# Patient Record
Sex: FEMALE | Race: WHITE | NOT HISPANIC OR LATINO | Employment: FULL TIME | URBAN - METROPOLITAN AREA
[De-identification: names, ages, dates, MRNs, and addresses within clinical notes are randomized per-mention and may not be internally consistent; named-entity substitution may affect disease eponyms.]

---

## 2021-01-30 ENCOUNTER — NURSE TRIAGE (OUTPATIENT)
Dept: OTHER | Facility: OTHER | Age: 61
End: 2021-01-30

## 2021-01-30 NOTE — TELEPHONE ENCOUNTER
Regarding: COVID- Symptomatic/ Sore Throat- Family 1 of 3  ----- Message from [address]  sent at 1/30/2021 10:15 AM EST -----  "I would like myself as well and my daughter and  to get tested for COVID-19   I have a sore throat, my daughter has a headache, and my  has a cough, sneezing, and sore throat "    Juanita w/ Cliff Espinal and FORTINO Kauffman

## 2021-03-31 DIAGNOSIS — Z23 ENCOUNTER FOR IMMUNIZATION: ICD-10-CM

## 2022-04-27 ENCOUNTER — HOSPITAL ENCOUNTER (INPATIENT)
Facility: HOSPITAL | Age: 62
LOS: 9 days | Discharge: HOME | DRG: 336 | End: 2022-05-06
Attending: EMERGENCY MEDICINE | Admitting: SURGERY
Payer: COMMERCIAL

## 2022-04-27 ENCOUNTER — APPOINTMENT (EMERGENCY)
Dept: CT IMAGING | Facility: HOSPITAL | Age: 62
DRG: 336 | End: 2022-04-27
Payer: COMMERCIAL

## 2022-04-27 DIAGNOSIS — R10.9 ABDOMINAL PAIN: ICD-10-CM

## 2022-04-27 DIAGNOSIS — K56.609 SBO (SMALL BOWEL OBSTRUCTION) (HCC): Primary | ICD-10-CM

## 2022-04-27 LAB
ALBUMIN SERPL BCP-MCNC: 4.2 G/DL (ref 3.5–5)
ALP SERPL-CCNC: 100 U/L (ref 46–116)
ALT SERPL W P-5'-P-CCNC: 25 U/L (ref 12–78)
ANION GAP SERPL CALCULATED.3IONS-SCNC: 10 MMOL/L (ref 4–13)
AST SERPL W P-5'-P-CCNC: 21 U/L (ref 5–45)
ATRIAL RATE: 87 BPM
BASOPHILS # BLD AUTO: 0.02 THOUSANDS/ΜL (ref 0–0.1)
BASOPHILS NFR BLD AUTO: 0 % (ref 0–1)
BILIRUB SERPL-MCNC: 1.34 MG/DL (ref 0.2–1)
BILIRUB UR QL STRIP: NEGATIVE
BUN SERPL-MCNC: 28 MG/DL (ref 5–25)
CALCIUM SERPL-MCNC: 10 MG/DL (ref 8.3–10.1)
CARDIAC TROPONIN I PNL SERPL HS: 3 NG/L
CHLORIDE SERPL-SCNC: 99 MMOL/L (ref 100–108)
CLARITY UR: CLEAR
CO2 SERPL-SCNC: 27 MMOL/L (ref 21–32)
COLOR UR: YELLOW
CREAT SERPL-MCNC: 0.93 MG/DL (ref 0.6–1.3)
EOSINOPHIL # BLD AUTO: 0.03 THOUSAND/ΜL (ref 0–0.61)
EOSINOPHIL NFR BLD AUTO: 0 % (ref 0–6)
ERYTHROCYTE [DISTWIDTH] IN BLOOD BY AUTOMATED COUNT: 11.9 % (ref 11.6–15.1)
GFR SERPL CREATININE-BSD FRML MDRD: 65 ML/MIN/1.73SQ M
GLUCOSE SERPL-MCNC: 134 MG/DL (ref 65–140)
GLUCOSE UR STRIP-MCNC: NEGATIVE MG/DL
HCT VFR BLD AUTO: 47.9 % (ref 34.8–46.1)
HGB BLD-MCNC: 17 G/DL (ref 11.5–15.4)
HGB UR QL STRIP.AUTO: NEGATIVE
IMM GRANULOCYTES # BLD AUTO: 0.02 THOUSAND/UL (ref 0–0.2)
IMM GRANULOCYTES NFR BLD AUTO: 0 % (ref 0–2)
KETONES UR STRIP-MCNC: NEGATIVE MG/DL
LEUKOCYTE ESTERASE UR QL STRIP: NEGATIVE
LIPASE SERPL-CCNC: 149 U/L (ref 73–393)
LYMPHOCYTES # BLD AUTO: 1.09 THOUSANDS/ΜL (ref 0.6–4.47)
LYMPHOCYTES NFR BLD AUTO: 11 % (ref 14–44)
MCH RBC QN AUTO: 31.9 PG (ref 26.8–34.3)
MCHC RBC AUTO-ENTMCNC: 35.5 G/DL (ref 31.4–37.4)
MCV RBC AUTO: 90 FL (ref 82–98)
MONOCYTES # BLD AUTO: 1.1 THOUSAND/ΜL (ref 0.17–1.22)
MONOCYTES NFR BLD AUTO: 11 % (ref 4–12)
NEUTROPHILS # BLD AUTO: 8 THOUSANDS/ΜL (ref 1.85–7.62)
NEUTS SEG NFR BLD AUTO: 78 % (ref 43–75)
NITRITE UR QL STRIP: NEGATIVE
NRBC BLD AUTO-RTO: 0 /100 WBCS
P AXIS: 39 DEGREES
PH UR STRIP.AUTO: 5.5 [PH]
PLATELET # BLD AUTO: 265 THOUSANDS/UL (ref 149–390)
PLATELET # BLD AUTO: 292 THOUSANDS/UL (ref 149–390)
PMV BLD AUTO: 9.4 FL (ref 8.9–12.7)
PMV BLD AUTO: 9.8 FL (ref 8.9–12.7)
POTASSIUM SERPL-SCNC: 4.6 MMOL/L (ref 3.5–5.3)
PR INTERVAL: 146 MS
PROT SERPL-MCNC: 8.2 G/DL (ref 6.4–8.2)
PROT UR STRIP-MCNC: NEGATIVE MG/DL
QRS AXIS: -4 DEGREES
QRSD INTERVAL: 80 MS
QT INTERVAL: 372 MS
QTC INTERVAL: 447 MS
RBC # BLD AUTO: 5.33 MILLION/UL (ref 3.81–5.12)
SODIUM SERPL-SCNC: 136 MMOL/L (ref 136–145)
SP GR UR STRIP.AUTO: >=1.03 (ref 1–1.03)
T WAVE AXIS: 56 DEGREES
UROBILINOGEN UR QL STRIP.AUTO: 0.2 E.U./DL
VENTRICULAR RATE: 87 BPM
WBC # BLD AUTO: 10.26 THOUSAND/UL (ref 4.31–10.16)

## 2022-04-27 PROCEDURE — 96361 HYDRATE IV INFUSION ADD-ON: CPT

## 2022-04-27 PROCEDURE — 96374 THER/PROPH/DIAG INJ IV PUSH: CPT

## 2022-04-27 PROCEDURE — 36415 COLL VENOUS BLD VENIPUNCTURE: CPT

## 2022-04-27 PROCEDURE — 80053 COMPREHEN METABOLIC PANEL: CPT | Performed by: EMERGENCY MEDICINE

## 2022-04-27 PROCEDURE — 96375 TX/PRO/DX INJ NEW DRUG ADDON: CPT

## 2022-04-27 PROCEDURE — 74177 CT ABD & PELVIS W/CONTRAST: CPT

## 2022-04-27 PROCEDURE — 99285 EMERGENCY DEPT VISIT HI MDM: CPT | Performed by: EMERGENCY MEDICINE

## 2022-04-27 PROCEDURE — 85049 AUTOMATED PLATELET COUNT: CPT | Performed by: SURGERY

## 2022-04-27 PROCEDURE — 83690 ASSAY OF LIPASE: CPT | Performed by: EMERGENCY MEDICINE

## 2022-04-27 PROCEDURE — 93005 ELECTROCARDIOGRAM TRACING: CPT

## 2022-04-27 PROCEDURE — 85025 COMPLETE CBC W/AUTO DIFF WBC: CPT | Performed by: EMERGENCY MEDICINE

## 2022-04-27 PROCEDURE — 81003 URINALYSIS AUTO W/O SCOPE: CPT | Performed by: EMERGENCY MEDICINE

## 2022-04-27 PROCEDURE — G1004 CDSM NDSC: HCPCS

## 2022-04-27 PROCEDURE — 93010 ELECTROCARDIOGRAM REPORT: CPT | Performed by: INTERNAL MEDICINE

## 2022-04-27 PROCEDURE — 84484 ASSAY OF TROPONIN QUANT: CPT | Performed by: EMERGENCY MEDICINE

## 2022-04-27 PROCEDURE — 99285 EMERGENCY DEPT VISIT HI MDM: CPT

## 2022-04-27 RX ORDER — MORPHINE SULFATE 4 MG/ML
4 INJECTION, SOLUTION INTRAMUSCULAR; INTRAVENOUS ONCE
Status: COMPLETED | OUTPATIENT
Start: 2022-04-27 | End: 2022-04-27

## 2022-04-27 RX ORDER — ATORVASTATIN CALCIUM 10 MG/1
10 TABLET, FILM COATED ORAL DAILY
COMMUNITY

## 2022-04-27 RX ORDER — ONDANSETRON 2 MG/ML
4 INJECTION INTRAMUSCULAR; INTRAVENOUS EVERY 6 HOURS PRN
Status: DISCONTINUED | OUTPATIENT
Start: 2022-04-27 | End: 2022-05-06 | Stop reason: HOSPADM

## 2022-04-27 RX ORDER — ONDANSETRON 2 MG/ML
4 INJECTION INTRAMUSCULAR; INTRAVENOUS ONCE
Status: COMPLETED | OUTPATIENT
Start: 2022-04-27 | End: 2022-04-27

## 2022-04-27 RX ORDER — HEPARIN SODIUM 5000 [USP'U]/ML
5000 INJECTION, SOLUTION INTRAVENOUS; SUBCUTANEOUS EVERY 8 HOURS SCHEDULED
Status: DISCONTINUED | OUTPATIENT
Start: 2022-04-27 | End: 2022-05-06 | Stop reason: HOSPADM

## 2022-04-27 RX ORDER — CALCIUM CARBONATE 200(500)MG
1000 TABLET,CHEWABLE ORAL DAILY PRN
Status: DISCONTINUED | OUTPATIENT
Start: 2022-04-27 | End: 2022-05-06 | Stop reason: HOSPADM

## 2022-04-27 RX ORDER — SODIUM CHLORIDE, SODIUM LACTATE, POTASSIUM CHLORIDE, CALCIUM CHLORIDE 600; 310; 30; 20 MG/100ML; MG/100ML; MG/100ML; MG/100ML
125 INJECTION, SOLUTION INTRAVENOUS CONTINUOUS
Status: DISCONTINUED | OUTPATIENT
Start: 2022-04-27 | End: 2022-04-28

## 2022-04-27 RX ADMIN — FAMOTIDINE 20 MG: 10 INJECTION, SOLUTION INTRAVENOUS at 16:28

## 2022-04-27 RX ADMIN — SODIUM CHLORIDE 1000 ML: 0.9 INJECTION, SOLUTION INTRAVENOUS at 16:27

## 2022-04-27 RX ADMIN — ONDANSETRON 4 MG: 2 INJECTION INTRAMUSCULAR; INTRAVENOUS at 16:28

## 2022-04-27 RX ADMIN — IOHEXOL 100 ML: 350 INJECTION, SOLUTION INTRAVENOUS at 18:10

## 2022-04-27 RX ADMIN — HEPARIN SODIUM 5000 UNITS: 5000 INJECTION INTRAVENOUS; SUBCUTANEOUS at 22:03

## 2022-04-27 RX ADMIN — MORPHINE SULFATE 2 MG: 2 INJECTION, SOLUTION INTRAMUSCULAR; INTRAVENOUS at 22:03

## 2022-04-27 RX ADMIN — SODIUM CHLORIDE, SODIUM LACTATE, POTASSIUM CHLORIDE, AND CALCIUM CHLORIDE 125 ML/HR: .6; .31; .03; .02 INJECTION, SOLUTION INTRAVENOUS at 19:27

## 2022-04-27 RX ADMIN — IOHEXOL 50 ML: 240 INJECTION, SOLUTION INTRATHECAL; INTRAVASCULAR; INTRAVENOUS; ORAL at 18:00

## 2022-04-27 RX ADMIN — MORPHINE SULFATE 4 MG: 4 INJECTION INTRAVENOUS at 18:58

## 2022-04-27 NOTE — ED PROVIDER NOTES
History  Chief Complaint   Patient presents with    Abdominal Pain     Patient c/o mid center abdominal pain  Patient c/o nausea and vomitting  53-year-old female presents to the emergency department for abdominal pain  Patient says that she has had abdominal pain since last night, waxing and waning, localized periumbilical area, radiating across bilaterally, worse with movement  She has also had nausea with several episodes of vomiting last night  No ongoing vomiting today  She has had constipation and is on stool softeners but did have a small bowel movement this morning  Says she has not passed any flatus since then  Abdominal surgeries include appendectomy, ovarian tumor removal, and hysterectomy  Denies fever, chills, cough, chest pain, SOB, diarrhea, pain, flank pain, urinary symptoms, headache, any other complaints  Prior to Admission Medications   Prescriptions Last Dose Informant Patient Reported? Taking?   conjugated estrogens (PREMARIN) 0 3 mg tablet   Yes No   Sig: Take 0 3 mg by mouth every other day Take daily for 21 days then do not take for 7 days  naproxen (NAPROSYN) 500 mg tablet   No No   Sig: Take 1 tablet by mouth 2 (two) times a day as needed for mild pain      Facility-Administered Medications: None       History reviewed  No pertinent past medical history  Past Surgical History:   Procedure Laterality Date    APPENDECTOMY      BLADDER SUSPENSION      HYSTERECTOMY      OOPHORECTOMY      TUMOR REMOVAL  2003    spinal chord       History reviewed  No pertinent family history  I have reviewed and agree with the history as documented  E-Cigarette/Vaping     E-Cigarette/Vaping Substances     Social History     Tobacco Use    Smoking status: Never Smoker    Smokeless tobacco: Never Used   Substance Use Topics    Alcohol use: No    Drug use: No       Review of Systems   Constitutional: Negative  Negative for chills and fever  HENT: Negative    Negative for rhinorrhea  Eyes: Negative  Respiratory: Negative  Negative for cough and shortness of breath  Cardiovascular: Negative  Negative for chest pain and leg swelling  Gastrointestinal: Positive for abdominal pain, constipation, nausea and vomiting  Negative for diarrhea  Genitourinary: Negative  Negative for dysuria, flank pain and frequency  Musculoskeletal: Negative  Negative for back pain and neck pain  Skin: Negative  Negative for rash  Neurological: Negative  Negative for light-headedness and headaches  All other systems reviewed and are negative  Physical Exam  Physical Exam  Vitals and nursing note reviewed  Constitutional:       General: She is not in acute distress  Appearance: She is well-developed  HENT:      Head: Normocephalic and atraumatic  Mouth/Throat:      Mouth: Mucous membranes are moist    Eyes:      Pupils: Pupils are equal, round, and reactive to light  Cardiovascular:      Rate and Rhythm: Normal rate and regular rhythm  Pulses:           Radial pulses are 2+ on the right side and 2+ on the left side  Heart sounds: Normal heart sounds  No murmur heard  No friction rub  No gallop  Pulmonary:      Effort: Pulmonary effort is normal  No respiratory distress  Breath sounds: Normal breath sounds  No stridor  No wheezing, rhonchi or rales  Abdominal:      Palpations: Abdomen is soft  Tenderness: There is no abdominal tenderness  There is no right CVA tenderness, left CVA tenderness, guarding or rebound  Negative signs include Damon's sign  Musculoskeletal:         General: No swelling or tenderness  Normal range of motion  Cervical back: Normal range of motion and neck supple  Right lower leg: No edema  Left lower leg: No edema  Skin:     General: Skin is warm and dry  Capillary Refill: Capillary refill takes less than 2 seconds     Neurological:      Mental Status: She is alert and oriented to person, place, and time  Cranial Nerves: No cranial nerve deficit        Comments: Clear fluent speech         Vital Signs  ED Triage Vitals   Temperature Pulse Respirations Blood Pressure SpO2   04/27/22 1541 04/27/22 1541 04/27/22 1541 04/27/22 1541 04/27/22 1541   97 9 °F (36 6 °C) 98 20 125/85 96 %      Temp Source Heart Rate Source Patient Position - Orthostatic VS BP Location FiO2 (%)   04/27/22 1541 04/27/22 1541 04/27/22 1541 04/27/22 1541 --   Oral Monitor Sitting Left arm       Pain Score       04/27/22 1858       8           Vitals:    04/27/22 1541 04/27/22 1900   BP: 125/85 132/67   Pulse: 98 98   Patient Position - Orthostatic VS: Sitting          Visual Acuity      ED Medications  Medications   lactated ringers infusion (125 mL/hr Intravenous New Bag 4/27/22 1927)   calcium carbonate (TUMS) chewable tablet 1,000 mg (has no administration in time range)   ondansetron (ZOFRAN) injection 4 mg (has no administration in time range)   heparin (porcine) subcutaneous injection 5,000 Units (has no administration in time range)   morphine injection 2 mg (has no administration in time range)   sodium chloride 0 9 % bolus 1,000 mL (0 mL Intravenous Stopped 4/27/22 1801)   ondansetron (ZOFRAN) injection 4 mg (4 mg Intravenous Given 4/27/22 1628)   famotidine (PEPCID) injection 20 mg (20 mg Intravenous Given 4/27/22 1628)   iohexol (OMNIPAQUE) 350 MG/ML injection (SINGLE-DOSE) 100 mL (100 mL Intravenous Given 4/27/22 1810)   iohexol (OMNIPAQUE) 240 MG/ML solution 50 mL (50 mL Oral Given 4/27/22 1800)   morphine (PF) 4 mg/mL injection 4 mg (4 mg Intravenous Given 4/27/22 1858)       Diagnostic Studies  Results Reviewed     Procedure Component Value Units Date/Time    Platelet count [[id number]]     Lab Status: No result Specimen: Blood     Comprehensive metabolic panel [[id number]]  (Abnormal) Collected: 04/27/22 1549    Lab Status: Final result Specimen: Blood from Arm, Left Updated: 04/27/22 1626     Sodium 136 mmol/L Potassium 4 6 mmol/L      Chloride 99 mmol/L      CO2 27 mmol/L      ANION GAP 10 mmol/L      BUN 28 mg/dL      Creatinine 0 93 mg/dL      Glucose 134 mg/dL      Calcium 10 0 mg/dL      AST 21 U/L      ALT 25 U/L      Alkaline Phosphatase 100 U/L      Total Protein 8 2 g/dL      Albumin 4 2 g/dL      Total Bilirubin 1 34 mg/dL      eGFR 65 ml/min/1 73sq m     Narrative:      National Kidney Disease Foundation guidelines for Chronic Kidney Disease (CKD):     Stage 1 with normal or high GFR (GFR > 90 mL/min/1 73 square meters)    Stage 2 Mild CKD (GFR = 60-89 mL/min/1 73 square meters)    Stage 3A Moderate CKD (GFR = 45-59 mL/min/1 73 square meters)    Stage 3B Moderate CKD (GFR = 30-44 mL/min/1 73 square meters)    Stage 4 Severe CKD (GFR = 15-29 mL/min/1 73 square meters)    Stage 5 End Stage CKD (GFR <15 mL/min/1 73 square meters)  Note: GFR calculation is accurate only with a steady state creatinine    Lipase [[id number]]  (Normal) Collected: 04/27/22 1549    Lab Status: Final result Specimen: Blood from Arm, Left Updated: 04/27/22 1626     Lipase 149 u/L     HS Troponin 0hr (reflex protocol) [[id number]]  (Normal) Collected: 04/27/22 1549    Lab Status: Final result Specimen: Blood from Arm, Left Updated: 04/27/22 1623     hs TnI 0hr 3 ng/L     UA w Reflex to Microscopic w Reflex to Culture [[id number]] Collected: 04/27/22 1601    Lab Status: Final result Specimen: Urine, Clean Catch Updated: 04/27/22 1610     Color, UA Yellow     Clarity, UA Clear     Specific Gravity, UA >=1 030     pH, UA 5 5     Leukocytes, UA Negative     Nitrite, UA Negative     Protein, UA Negative mg/dl      Glucose, UA Negative mg/dl      Ketones, UA Negative mg/dl      Urobilinogen, UA 0 2 E U /dl      Bilirubin, UA Negative     Blood, UA Negative    CBC and differential [[id number]]  (Abnormal) Collected: 04/27/22 1549    Lab Status: Final result Specimen: Blood from Arm, Left Updated: 04/27/22 1555     WBC 10 26 Thousand/uL      RBC 5 33 Million/uL      Hemoglobin 17 0 g/dL      Hematocrit 47 9 %      MCV 90 fL      MCH 31 9 pg      MCHC 35 5 g/dL      RDW 11 9 %      MPV 9 8 fL      Platelets 309 Thousands/uL      nRBC 0 /100 WBCs      Neutrophils Relative 78 %      Immat GRANS % 0 %      Lymphocytes Relative 11 %      Monocytes Relative 11 %      Eosinophils Relative 0 %      Basophils Relative 0 %      Neutrophils Absolute 8 00 Thousands/µL      Immature Grans Absolute 0 02 Thousand/uL      Lymphocytes Absolute 1 09 Thousands/µL      Monocytes Absolute 1 10 Thousand/µL      Eosinophils Absolute 0 03 Thousand/µL      Basophils Absolute 0 02 Thousands/µL                  CT abdomen pelvis with contrast   Final Result by Lori Zamora MD (04/27 1828)      Small bowel obstruction with the transition point in the right side of the abdomen where there is a loop of thickened small bowel with surrounding mild inflammation  This could reflect focal enteritis although underlying no bowel lesion cannot be    excluded  Clinical correlation and follow-up advised  Workstation performed: [id number]                    Procedures  Procedures         ED Course  ED Course as of 04/27/22 1934   Wed Apr 27, 2022   1622 Procedure Note: EKG  Date/Time: 04/27/22 4:22 PM   Interpreted by: Christy Booker  Indications / Diagnosis: Abd pain  ECG reviewed by me, the ED Provider: yes   The EKG demonstrates:  Rate: 87  Rhythm: normal sinus  Intervals:normal intervals  Axis: normal axis  QRS/Blocks: normal QRS  ST Changes: No acute ST Changes, no STD/DIANDRA  SBIRT 22yo+      Most Recent Value   SBIRT (22 yo +)    In order to provide better care to our patients, we are screening all of our patients for alcohol and drug use  Would it be okay to ask you these screening questions?  No Filed at: 04/27/2022 1646                    [facility]  Number of Diagnoses or Management Options  Abdominal pain  SBO (small bowel obstruction) [facility])  Diagnosis management comments: 78-year-old female presents to the emergency department for abdominal pain  Within the differential diagnosis for abdominal pain consider pancreatitis, cholecystitis, gastritis, SBO, diverticulitis, AAA, UTI/pyelonephritis  Will obtain workup to assess for these etiologies and medicate for symptoms  Final assessment:  CT consistent with SBO  Plan to have the nurse place NG tube and admit to surgery service  Dr Gwynda Cockayne accepts patient for further management  Disposition  Final diagnoses:   Abdominal pain   SBO (small bowel obstruction) ([address] )     Time reflects when diagnosis was documented in both MDM as applicable and the Disposition within this note     Time User Action Codes Description Comment    4/27/2022  7:08 PM Minor, Christy Add [R10 9] Abdominal pain     4/27/2022  7:08 PM Minor, Christy Add [K56 609] SBO (small bowel obstruction) [facility])       ED Disposition     ED Disposition Condition Date/Time Comment    Admit Stable Wed Apr 27, 2022  7:09 PM Case was discussed with gen surg and the patient's admission status was agreed to be Admission Status: inpatient status to the service of gen surg   Follow-up Information    None         Patient's Medications   Discharge Prescriptions    No medications on file       No discharge procedures on file      PDMP Review     None          ED Provider  Electronically Signed by           Jessica Felix MD  04/27/22 6794

## 2022-04-27 NOTE — LETTER
[address] FLO[address] MED SURG UNIT  [address] [place] [address]  Dept: [phone]    May 5, 2022     Patient: Jesica Reis   YOB: 1960   Date of Visit: 4/27/2022       To Whom it May Concern:    Jacinda Sánchez is under my professional care  She was seen in the hospital from 4/27/2022   to 05/05/22  She may return to school on 5/17/2022 with the following limitations light duty  If you have any questions or concerns, please don't hesitate to call           Sincerely,       Catherine Fuentes PA-C

## 2022-04-28 PROBLEM — K56.609 SMALL BOWEL OBSTRUCTION (HCC): Status: ACTIVE | Noted: 2022-04-28

## 2022-04-28 LAB
ANION GAP SERPL CALCULATED.3IONS-SCNC: 4 MMOL/L (ref 4–13)
BASOPHILS # BLD AUTO: 0.04 THOUSANDS/ΜL (ref 0–0.1)
BASOPHILS NFR BLD AUTO: 1 % (ref 0–1)
BUN SERPL-MCNC: 24 MG/DL (ref 5–25)
CALCIUM SERPL-MCNC: 9 MG/DL (ref 8.3–10.1)
CHLORIDE SERPL-SCNC: 100 MMOL/L (ref 100–108)
CO2 SERPL-SCNC: 30 MMOL/L (ref 21–32)
CREAT SERPL-MCNC: 0.95 MG/DL (ref 0.6–1.3)
EOSINOPHIL # BLD AUTO: 0.03 THOUSAND/ΜL (ref 0–0.61)
EOSINOPHIL NFR BLD AUTO: 0 % (ref 0–6)
ERYTHROCYTE [DISTWIDTH] IN BLOOD BY AUTOMATED COUNT: 11.9 % (ref 11.6–15.1)
GFR SERPL CREATININE-BSD FRML MDRD: 63 ML/MIN/1.73SQ M
GLUCOSE SERPL-MCNC: 111 MG/DL (ref 65–140)
HCT VFR BLD AUTO: 45.1 % (ref 34.8–46.1)
HGB BLD-MCNC: 15.4 G/DL (ref 11.5–15.4)
IMM GRANULOCYTES # BLD AUTO: 0.01 THOUSAND/UL (ref 0–0.2)
IMM GRANULOCYTES NFR BLD AUTO: 0 % (ref 0–2)
LYMPHOCYTES # BLD AUTO: 1 THOUSANDS/ΜL (ref 0.6–4.47)
LYMPHOCYTES NFR BLD AUTO: 13 % (ref 14–44)
MAGNESIUM SERPL-MCNC: 1.9 MG/DL (ref 1.6–2.6)
MCH RBC QN AUTO: 32.2 PG (ref 26.8–34.3)
MCHC RBC AUTO-ENTMCNC: 34.1 G/DL (ref 31.4–37.4)
MCV RBC AUTO: 94 FL (ref 82–98)
MONOCYTES # BLD AUTO: 1.07 THOUSAND/ΜL (ref 0.17–1.22)
MONOCYTES NFR BLD AUTO: 14 % (ref 4–12)
NEUTROPHILS # BLD AUTO: 5.29 THOUSANDS/ΜL (ref 1.85–7.62)
NEUTS SEG NFR BLD AUTO: 72 % (ref 43–75)
NRBC BLD AUTO-RTO: 0 /100 WBCS
PLATELET # BLD AUTO: 266 THOUSANDS/UL (ref 149–390)
PMV BLD AUTO: 9.7 FL (ref 8.9–12.7)
POTASSIUM SERPL-SCNC: 4.6 MMOL/L (ref 3.5–5.3)
RBC # BLD AUTO: 4.79 MILLION/UL (ref 3.81–5.12)
SODIUM SERPL-SCNC: 134 MMOL/L (ref 136–145)
WBC # BLD AUTO: 7.44 THOUSAND/UL (ref 4.31–10.16)

## 2022-04-28 PROCEDURE — 83735 ASSAY OF MAGNESIUM: CPT | Performed by: PHYSICIAN ASSISTANT

## 2022-04-28 PROCEDURE — 99222 1ST HOSP IP/OBS MODERATE 55: CPT | Performed by: SURGERY

## 2022-04-28 PROCEDURE — 85025 COMPLETE CBC W/AUTO DIFF WBC: CPT | Performed by: PHYSICIAN ASSISTANT

## 2022-04-28 PROCEDURE — 80048 BASIC METABOLIC PNL TOTAL CA: CPT | Performed by: PHYSICIAN ASSISTANT

## 2022-04-28 RX ORDER — ACETAMINOPHEN 325 MG/1
650 TABLET ORAL EVERY 6 HOURS PRN
Status: DISCONTINUED | OUTPATIENT
Start: 2022-04-28 | End: 2022-05-06 | Stop reason: HOSPADM

## 2022-04-28 RX ORDER — DEXTROSE, SODIUM CHLORIDE, AND POTASSIUM CHLORIDE 5; .45; .15 G/100ML; G/100ML; G/100ML
125 INJECTION INTRAVENOUS CONTINUOUS
Status: DISCONTINUED | OUTPATIENT
Start: 2022-04-28 | End: 2022-05-03

## 2022-04-28 RX ADMIN — HEPARIN SODIUM 5000 UNITS: 5000 INJECTION INTRAVENOUS; SUBCUTANEOUS at 21:16

## 2022-04-28 RX ADMIN — HEPARIN SODIUM 5000 UNITS: 5000 INJECTION INTRAVENOUS; SUBCUTANEOUS at 04:36

## 2022-04-28 RX ADMIN — HEPARIN SODIUM 5000 UNITS: 5000 INJECTION INTRAVENOUS; SUBCUTANEOUS at 14:19

## 2022-04-28 RX ADMIN — DEXTROSE, SODIUM CHLORIDE, AND POTASSIUM CHLORIDE 125 ML/HR: 5; .45; .15 INJECTION INTRAVENOUS at 16:28

## 2022-04-28 RX ADMIN — SODIUM CHLORIDE, SODIUM LACTATE, POTASSIUM CHLORIDE, AND CALCIUM CHLORIDE 125 ML/HR: .6; .31; .03; .02 INJECTION, SOLUTION INTRAVENOUS at 06:55

## 2022-04-28 RX ADMIN — ACETAMINOPHEN 650 MG: 325 TABLET, FILM COATED ORAL at 17:42

## 2022-04-28 RX ADMIN — MORPHINE SULFATE 2 MG: 2 INJECTION, SOLUTION INTRAMUSCULAR; INTRAVENOUS at 04:35

## 2022-04-28 RX ADMIN — ACETAMINOPHEN 650 MG: 325 TABLET, FILM COATED ORAL at 23:42

## 2022-04-28 NOTE — UTILIZATION REVIEW
Initial Clinical Review    Admission: Date/Time/Statement:   Admission Orders (From admission, onward)     Ordered        04/27/22 1855  Inpatient Admission  Once                      Orders Placed This Encounter   Procedures    Inpatient Admission     Standing Status:   Standing     Number of Occurrences:   1     Order Specific Question:   Level of Care     Answer:   Med Surg [16]     Order Specific Question:   Estimated length of stay     Answer:   More than 2 Midnights     Order Specific Question:   Certification     Answer:   I certify that inpatient services are medically necessary for this patient for a duration of greater than two midnights  See H&P and MD Progress Notes for additional information about the patient's course of treatment  ED Arrival Information     Expected Arrival Acuity    - 4/27/2022 15:32 Urgent         Means of arrival Escorted by Service Admission type    Walk-In Family Member Surgery-General Urgent         Arrival complaint    Abdominal Pain,Vomiting,Weakness         Chief Complaint   Patient presents with    Abdominal Pain     Patient c/o mid center abdominal pain  Patient c/o nausea and vomitting  Initial Presentation: 59 y o  female to ED from home w/ abd pain waxing and waning, localized periumbilical area, radiating across bilaterally, worse with movement  She has also had nausea with several episodes of vomiting last night      Date: 4/27   Day 2:     ED Triage Vitals   Temperature Pulse Respirations Blood Pressure SpO2   04/27/22 1541 04/27/22 1541 04/27/22 1541 04/27/22 1541 04/27/22 1541   97 9 °F (36 6 °C) 98 20 125/85 96 %      Temp Source Heart Rate Source Patient Position - Orthostatic VS BP Location FiO2 (%)   04/27/22 1541 04/27/22 1541 04/27/22 1541 04/27/22 1541 --   Oral Monitor Sitting Left arm       Pain Score       04/27/22 1858       8          Wt Readings from Last 1 Encounters:   04/27/22 83 kg (183 lb)     Additional Vital Signs:   04/28/22 08:01:20 97 7 °F (36 5 °C) 101 -- 103/68 80 90 % -- --   04/28/22 00:13:03 97 6 °F (36 4 °C) -- 18 106/70 82 -- -- --   04/27/22 21:57:29 97 3 °F (36 3 °C) Abnormal  87 18 127/82 97 89 % Abnormal  -- --   04/27/22 2130 -- 91 18 119/74 -- 94 % None (Room air) Sitting   04/27/22 2112 -- 94 18 132/67 -- 98 % None (Room air) --   04/27/22 1900 -- 98 18 132/67 -- 98 % None (Room air) --   04/27/22 1647 -- -- -- -- -- -- None (Room air)        Pertinent Labs/Diagnostic Test Results:   4/27 EKG NSR   CT abdomen pelvis with contrast   Final Result by Alex Can MD (04/27 1828)      Small bowel obstruction with the transition point in the right side of the abdomen where there is a loop of thickened small bowel with surrounding mild inflammation  This could reflect focal enteritis although underlying no bowel lesion cannot be    excluded  Clinical correlation and follow-up advised                    Workstation performed: [id number]           Results from last 7 days   Lab Units 04/28/22  1031 04/27/22  2229 04/27/22  1549   WBC Thousand/uL 7 44  --  10 26*   HEMOGLOBIN g/dL 15 4  --  17 0*   HEMATOCRIT % 45 1  --  47 9*   PLATELETS Thousands/uL 266 265 292   NEUTROS ABS Thousands/µL 5 29  --  8 00*     Results from last 7 days   Lab Units 04/28/22  1031 04/27/22  1549   SODIUM mmol/L 134* 136   POTASSIUM mmol/L 4 6 4 6   CHLORIDE mmol/L 100 99*   CO2 mmol/L 30 27   ANION GAP mmol/L 4 10   BUN mg/dL 24 28*   CREATININE mg/dL 0 95 0 93   EGFR ml/min/1 73sq m 63 65   CALCIUM mg/dL 9 0 10 0   MAGNESIUM mg/dL 1 9  --      Results from last 7 days   Lab Units 04/27/22  1549   AST U/L 21   ALT U/L 25   ALK PHOS U/L 100   TOTAL PROTEIN g/dL 8 2   ALBUMIN g/dL 4 2   TOTAL BILIRUBIN mg/dL 1 34*     Results from last 7 days   Lab Units 04/28/22  1031 04/27/22  1549   GLUCOSE RANDOM mg/dL 111 134     Results from last 7 days   Lab Units 04/27/22  1549   HS TNI 0HR ng/L 3     Results from last 7 days   Lab Units 04/27/22  1549   LIPASE u/L 149 Results from last 7 days   Lab Units 04/27/22  1601   CLARITY UA  Clear   COLOR UA  Yellow   SPEC GRAV UA  >=1 030   PH UA  5 5   GLUCOSE UA mg/dl Negative   KETONES UA mg/dl Negative   BLOOD UA  Negative   PROTEIN UA mg/dl Negative   NITRITE UA  Negative   BILIRUBIN UA  Negative   UROBILINOGEN UA E U /dl 0 2   LEUKOCYTES UA  Negative       ED Treatment:   Medication Administration from 04/27/2022 1532 to 04/27/2022 2150       Date/Time Order Dose Route Action     04/27/2022 1627 sodium chloride 0 9 % bolus 1,000 mL 1,000 mL Intravenous New Bag     04/27/2022 1628 ondansetron (ZOFRAN) injection 4 mg 4 mg Intravenous Given     04/27/2022 1628 famotidine (PEPCID) injection 20 mg 20 mg Intravenous Given     04/27/2022 1858 morphine (PF) 4 mg/mL injection 4 mg 4 mg Intravenous Given     04/27/2022 1927 lactated ringers infusion 125 mL/hr Intravenous New Bag        History reviewed  No pertinent past medical history  Present on Admission:   Small bowel obstruction ([facility] [address] )      Admitting Diagnosis: Vomiting [R11 10]  SBO (small bowel obstruction) ([facility]) [K56 609]  Weakness [R53 1]  Abdominal pain [R10 9]  Age/Sex: 59 y o  female  Admission Orders:  Scheduled Medications:  heparin (porcine), 5,000 Units, Subcutaneous, Q8H [facility] & halfway      Continuous IV Infusions:  lactated ringers, 125 mL/hr, Intravenous, Continuous      PRN Meds:  calcium carbonate, 1,000 mg, Oral, Daily PRN  morphine injection, 2 mg, Intravenous, Q4H PRN  4/27  x1  4/28  x1  ondansetron, 4 mg, Intravenous, Q6H PRN      NG tube   I&O   NPO     Network Utilization Review Department  ATTENTION: Please call with any questions or concerns to [phone] and carefully listen to the prompts so that you are directed to the right person   All voicemails are confidential   Jeremias Larose all requests for admission clinical reviews, approved or denied determinations and any other requests to dedicated fax number below belonging to the campus where the patient is receiving treatment   List of dedicated fax numbers for the Facilities:  [address] DENIALS (Administrative/Medical Necessity) [phone]   [address] (Maternity/NICU/Pediatrics) [phone]   [address] [place] [address]  [address] Medical [place] [address] [address] [place] [address] [address] [facility] [phone]

## 2022-04-28 NOTE — UTILIZATION REVIEW
Inpatient Admission Authorization Request   NOTIFICATION OF INPATIENT ADMISSION/INPATIENT AUTHORIZATION REQUEST   SERVICING FACILITY:   [address] [place] [address]  Tax ID: [id number]  NPI: [id number]  Place of Service: Inpatient [address] [facility]  [address]  Place of Service Code: 24     ATTENDING PROVIDER:  Attending Name and NPI#: Alicia Cristina [[id number]]  Address: [address] [place] [address]  Phone: [phone]     UTILIZATION REVIEW CONTACT:  [employer] [employer] Utilization   Network Utilization Review Department  Phone: [phone]  Fax [phone]  Email: [email or web address]     PHYSICIAN ADVISORY SERVICES:  FOR [id number] REVIEW - MEDICAL NECESSITY DENIAL  Phone: [phone]  Fax: [phone]  Email: [email or web address]     TYPE OF REQUEST:  Inpatient Status     ADMISSION INFORMATION:  ADMISSION DATE/TIME: 4/27/22  6:55 PM  PATIENT DIAGNOSIS CODE/DESCRIPTION:  Vomiting [R11 10]  SBO (small bowel obstruction) ([facility] [address] ) [K56 609]  Weakness [R53 1]  Abdominal pain [R10 9]  DISCHARGE DATE/TIME: No discharge date for patient encounter  IMPORTANT INFORMATION:  Please contact the [facility] directly with any questions or concerns regarding this request  Department voicemails are confidential     Send requests for admission clinical reviews, concurrent reviews, approvals, and administrative denials due to lack of clinical to fax [phone]

## 2022-04-28 NOTE — H&P
GENERAL SURGERY HISTORY AND PHYSICAL    Kimberly Mi 59 y o  female MRN: [id number]  Unit/Bed#: -01 Encounter: [id number]      Assessment/Plan   59y F with Small Bowel Obstruction  - abdominal pain starting morning 4/26/22 and progressing with nausea and vomiting   - No flatus or BM  - CT shows distended loops of small bowel measuring up to 3 6 cm with transition point in the right abdomen where there is a loop of small bowel with wall thickening and surrounding mild inflammatory stranding as well as fecalization proximal to this region   - NG tube placed in the ED with 1 L output overnight  Abdominal pain has improved but abdomen still distended on exam with tympany on percussion  - history of robotic umbilical/ventral hernia repair, open right oophorectomy and ovarian tumor with appendectomy in the 90s, and JEN about 15 years ago    Plan  -- no acute surgical intervention at this time  Continue with NG tube decompression and a diet NPO with IV fluids  Okay for ice chips  -- encourage ambulation out of bed  Okay to clamp NG tube for ambulation as tolerated  Please reconnected patient develops increase in symptoms such as abdominal pain, distention, nausea or vomiting  -- DVT prophylaxis  -- analgesia and antiemetics, p r n   -- serial labs and abdominal exams  -- if no improvement in the morning will check abdominal x-ray and/or consider small-bowel follow-through study  ______________________________________________________________________  Chief Complaint:  I started having pain on Tuesday morning it just got worse throughout the day  HPI: Javier Magdaleno is a 59y o  year old female with PMHx of a several abdominal surgeries who presented to the emergency department last night with abdominal pain which started Tuesday morning of practice aggressively became worse throughout the day with abdominal distention, nausea and vomiting    Patient states she has had intermittent abdominal pain over the last few months which resolves and has not been this severe  She states she does with constipation and sometimes will not go for 2-3 days and then have a small formed bowel movement  She states and she has normal bowel movements in the cycle repeats itself  She does not take anything for her constipation but did take a stool softener on Wednesday morning  She did have a small amount of stool but has not had any flatus or bowel movement since that time  Patient states when she presented to emergency department her abdomen was very painful and distended  This has improved with NG tube decompression and she does have some abdominal soreness but not the pain she had on presentation  She denies any current nausea or vomiting  She is not passing any flatus  She denies any shortness of breath or chest pain  She notes she is feeling very tired   The patient's surgical history includes a robotic ventral hernia repair with mesh in May of 2021, total abdominal hysterectomy about 15 years ago, and removal of a right ovarian tumor as well as appendectomy in the 90s  Review of Systems   Constitutional: Positive for appetite change  Negative for activity change, chills and fever  HENT: Negative  Eyes: Negative  Respiratory: Negative for cough and shortness of breath  Cardiovascular: Negative for chest pain and leg swelling  Gastrointestinal: Positive for abdominal distention, abdominal pain and constipation  Negative for diarrhea, nausea and vomiting  Endocrine: Negative  Genitourinary: Negative for difficulty urinating, dysuria and frequency  Musculoskeletal: Negative  Skin: Negative  Allergic/Immunologic: Negative  Neurological: Negative  Hematological: Negative  Psychiatric/Behavioral: Negative  All other systems reviewed and are negative        Meds/Allergies   Allergies   Allergen Reactions    Bactrim [Sulfamethoxazole-Trimethoprim] Hives     all current active meds have been reviewed    Historical Information   History reviewed  No pertinent past medical history  Past Surgical History:   Procedure Laterality Date    APPENDECTOMY      BLADDER SUSPENSION      HYSTERECTOMY      OOPHORECTOMY      TUMOR REMOVAL  2003    spinal chord     Social History   Social History     Substance and Sexual Activity   Alcohol Use Never     Social History     Substance and Sexual Activity   Drug Use No     Social History     Tobacco Use   Smoking Status Never Smoker   Smokeless Tobacco Never Used       Family History:  Negative/unremarkable except as detailed in HPI  Objective   Vitals: /70   Pulse 105   Temp 97 7 °F (36 5 °C)   Resp 18   Ht 5' 8" (1 727 m)   Wt 83 kg (183 lb)   SpO2 90%   BMI 27 83 kg/m² ,Body mass index is 27 83 kg/m²      Intake/Output Summary (Last 24 hours) at 4/28/2022 1707  Last data filed at 4/28/2022 1650  Gross per 24 hour   Intake 3133 33 ml   Output 1725 ml   Net 1408 33 ml     Invasive Devices  Report    Peripheral Intravenous Line            Peripheral IV 04/27/22 Right Antecubital 1 day          Drain            NG/OG/Enteral Tube Nasogastric 16 Fr Right nare <1 day                Lab Results:   CBC with diff:   Lab Results   Component Value Date    WBC 7 44 04/28/2022    HGB 15 4 04/28/2022    HCT 45 1 04/28/2022    MCV 94 04/28/2022     04/28/2022    MCH 32 2 04/28/2022    MCHC 34 1 04/28/2022    RDW 11 9 04/28/2022    MPV 9 7 04/28/2022    NRBC 0 04/28/2022   , BMP/CMP:   Lab Results   Component Value Date    SODIUM 134 (L) 04/28/2022    K 4 6 04/28/2022     04/28/2022    CO2 30 04/28/2022    BUN 24 04/28/2022    CREATININE 0 95 04/28/2022    CALCIUM 9 0 04/28/2022    EGFR 63 04/28/2022   , Coags: No results found for: PT, PTT, INR, CRP: No results found for: CRP, Urinalysis: No results found for: Carlos Flack, SPECGRAV, PHUR, LEUKOCYTESUR, NITRITE, PROTEINUA, GLUCOSEU, [place], BLOODU    Physical Exam  Vitals: /70   Pulse 105   Temp 97 7 °F (36 5 °C)   Resp 18   Ht 5' 8" (1 727 m)   Wt 83 kg (183 lb)   SpO2 90%   BMI 27 83 kg/m² ,Body mass index is 27 83 kg/m²  General appearance: AAO x3, no distress, appears stated age, cooperative  HEENT: PERRL, sclera clear, anicterus, oral mucosa is pink  Neck: No carotid bruits, trachea is midline    Back: no tenderness,deformity,   Lungs:clear throughout, no wheezes or rhonchi  Heart[de-identified] RRR, S1, S2 normal, no murmur  Abdomen:  Abdomen distended and tympanic to percussion, hypoactive bowel sounds, soft and non tender to palpation, no guarding, no rebound, no masses  Extremities:  No edema, no tenderness, pulses palpable  Skin:  Warm, dry, no rash, no jaundice  Neurologic: CN II-XII grossly intact, no tremor, affect appropriate    Imaging Studies: CT abdomen pelvis with contrast    Result Date: 4/27/2022  Impression: Small bowel obstruction with the transition point in the right side of the abdomen where there is a loop of thickened small bowel with surrounding mild inflammation  This could reflect focal enteritis although underlying no bowel lesion cannot be excluded  Clinical correlation and follow-up advised  Workstation performed: [id number]     EKG, Pathology, and Other Studies: I have personally reviewed pertinent reports      VTE Prophylaxis: Heparin     Code Status: Level 1 - Full Code    Jared Waite PA-C  4/28/2022

## 2022-04-29 LAB
ANION GAP SERPL CALCULATED.3IONS-SCNC: 6 MMOL/L (ref 4–13)
BASOPHILS # BLD MANUAL: 0.07 THOUSAND/UL (ref 0–0.1)
BASOPHILS NFR MAR MANUAL: 1 % (ref 0–1)
BUN SERPL-MCNC: 21 MG/DL (ref 5–25)
CALCIUM SERPL-MCNC: 8.7 MG/DL (ref 8.3–10.1)
CHLORIDE SERPL-SCNC: 100 MMOL/L (ref 100–108)
CO2 SERPL-SCNC: 27 MMOL/L (ref 21–32)
CREAT SERPL-MCNC: 0.67 MG/DL (ref 0.6–1.3)
EOSINOPHIL # BLD MANUAL: 0 THOUSAND/UL (ref 0–0.4)
EOSINOPHIL NFR BLD MANUAL: 0 % (ref 0–6)
ERYTHROCYTE [DISTWIDTH] IN BLOOD BY AUTOMATED COUNT: 11.7 % (ref 11.6–15.1)
GFR SERPL CREATININE-BSD FRML MDRD: 93 ML/MIN/1.73SQ M
GLUCOSE SERPL-MCNC: 143 MG/DL (ref 65–140)
HCT VFR BLD AUTO: 44.3 % (ref 34.8–46.1)
HGB BLD-MCNC: 15.1 G/DL (ref 11.5–15.4)
LG PLATELETS BLD QL SMEAR: PRESENT
LYMPHOCYTES # BLD AUTO: 1.53 THOUSAND/UL (ref 0.6–4.47)
LYMPHOCYTES # BLD AUTO: 23 % (ref 14–44)
MCH RBC QN AUTO: 32.1 PG (ref 26.8–34.3)
MCHC RBC AUTO-ENTMCNC: 34.1 G/DL (ref 31.4–37.4)
MCV RBC AUTO: 94 FL (ref 82–98)
METAMYELOCYTES NFR BLD MANUAL: 1 % (ref 0–1)
MONOCYTES # BLD AUTO: 1.06 THOUSAND/UL (ref 0–1.22)
MONOCYTES NFR BLD: 16 % (ref 4–12)
NEUTROPHILS # BLD MANUAL: 3.79 THOUSAND/UL (ref 1.85–7.62)
NEUTS BAND NFR BLD MANUAL: 9 % (ref 0–8)
NEUTS SEG NFR BLD AUTO: 48 % (ref 43–75)
PLATELET # BLD AUTO: 260 THOUSANDS/UL (ref 149–390)
PLATELET BLD QL SMEAR: ADEQUATE
PMV BLD AUTO: 9.5 FL (ref 8.9–12.7)
POTASSIUM SERPL-SCNC: 4.1 MMOL/L (ref 3.5–5.3)
RBC # BLD AUTO: 4.71 MILLION/UL (ref 3.81–5.12)
SODIUM SERPL-SCNC: 133 MMOL/L (ref 136–145)
VARIANT LYMPHS # BLD AUTO: 2 %
WBC # BLD AUTO: 6.65 THOUSAND/UL (ref 4.31–10.16)

## 2022-04-29 PROCEDURE — 85027 COMPLETE CBC AUTOMATED: CPT | Performed by: PHYSICIAN ASSISTANT

## 2022-04-29 PROCEDURE — 99232 SBSQ HOSP IP/OBS MODERATE 35: CPT | Performed by: SURGERY

## 2022-04-29 PROCEDURE — 85007 BL SMEAR W/DIFF WBC COUNT: CPT | Performed by: PHYSICIAN ASSISTANT

## 2022-04-29 PROCEDURE — 80048 BASIC METABOLIC PNL TOTAL CA: CPT | Performed by: PHYSICIAN ASSISTANT

## 2022-04-29 RX ADMIN — HEPARIN SODIUM 5000 UNITS: 5000 INJECTION INTRAVENOUS; SUBCUTANEOUS at 05:21

## 2022-04-29 RX ADMIN — ONDANSETRON 4 MG: 2 INJECTION INTRAMUSCULAR; INTRAVENOUS at 20:11

## 2022-04-29 RX ADMIN — ACETAMINOPHEN 650 MG: 325 TABLET, FILM COATED ORAL at 20:11

## 2022-04-29 RX ADMIN — HEPARIN SODIUM 5000 UNITS: 5000 INJECTION INTRAVENOUS; SUBCUTANEOUS at 14:51

## 2022-04-29 RX ADMIN — DEXTROSE, SODIUM CHLORIDE, AND POTASSIUM CHLORIDE 125 ML/HR: 5; .45; .15 INJECTION INTRAVENOUS at 20:10

## 2022-04-29 RX ADMIN — DEXTROSE, SODIUM CHLORIDE, AND POTASSIUM CHLORIDE 125 ML/HR: 5; .45; .15 INJECTION INTRAVENOUS at 06:08

## 2022-04-29 RX ADMIN — HEPARIN SODIUM 5000 UNITS: 5000 INJECTION INTRAVENOUS; SUBCUTANEOUS at 22:00

## 2022-04-29 NOTE — PROGRESS NOTES
Progress Note -Surgery MONICA Mi 59 y o  female MRN: [id number]  Unit/Bed#: -01 Encounter: [id number]      Assessment   59y F with Small Bowel Obstruction  - abdominal pain starting morning 4/26/22 and progressing with nausea and vomiting   - CT shows distended loops of small bowel measuring up to 3 6 cm with transition point in the right abdomen where there is a loop of small bowel with wall thickening and surrounding mild inflammatory stranding as well as fecalization proximal to this region  - 1L output yesterday, 100cc this morning; passing flatus this am; intermittent crampy pain  Plan   -- Attempt clamp trial this morning and will check residuals after 2-3h  Consider SBFT if patient becomes more symptomatic during clamp or has significant output on residual  -- Continue NPO with sips, IVF  -- Encourage ambulation  -- analgesia prn  ______________________________________________________________________  Subjective:   Patient states she is feeling better  Intermittent crampy pain that quickly resolves  Denies nausea  She does not feel bloated  Objective:    Vitals:  /72   Pulse 101   Temp 97 7 °F (36 5 °C) (Oral)   Resp 17   Ht 5' 8" (1 727 m)   Wt 83 kg (183 lb)   SpO2 94%   BMI 27 83 kg/m²     I/Os:  I/O last 3 completed shifts: In: 3841 7 [I V :3841 7]  Out: 5875 [Urine:850; Emesis/NG CRUQPB:2834]    I/O this shift:   In: 480 [P O :480]  Out: 600 [Emesis/NG output:600]    Invasive Devices  Report    Peripheral Intravenous Line            Peripheral IV 04/27/22 Right Antecubital 1 day          Drain            NG/OG/Enteral Tube Nasogastric 16 Fr Right nare 1 day                Medications:  Current Facility-Administered Medications   Medication Dose Route Frequency    acetaminophen (TYLENOL) tablet 650 mg  650 mg Per NG Tube Q6H PRN    calcium carbonate (TUMS) chewable tablet 1,000 mg  1,000 mg Oral Daily PRN    dextrose 5 % and sodium chloride 0 45 % with KCl 20 mEq/L infusion  125 mL/hr Intravenous Continuous    heparin (porcine) subcutaneous injection 5,000 Units  5,000 Units Subcutaneous Q8H [address]    morphine injection 2 mg  2 mg Intravenous Q4H PRN    ondansetron (ZOFRAN) injection 4 mg  4 mg Intravenous Q6H PRN                 Lab Results and Cultures:   CBC with diff:   Lab Results   Component Value Date    WBC 6 65 04/29/2022    HGB 15 1 04/29/2022    HCT 44 3 04/29/2022    MCV 94 04/29/2022     04/29/2022    MCH 32 1 04/29/2022    MCHC 34 1 04/29/2022    RDW 11 7 04/29/2022    MPV 9 5 04/29/2022    NRBC 0 04/28/2022       BMP/CMP:  Lab Results   Component Value Date    K 4 1 04/29/2022     04/29/2022    CO2 27 04/29/2022    BUN 21 04/29/2022    CREATININE 0 67 04/29/2022    CALCIUM 8 7 04/29/2022    AST 21 04/27/2022    ALT 25 04/27/2022    ALKPHOS 100 04/27/2022    EGFR 93 04/29/2022       Lipid Panel:   No results found for: CHOL    Coags:   No results found for: PT, PTT, INR     Urinalysis:   Lab Results   Component Value Date    COLORU Yellow 04/27/2022    CLARITYU Clear 04/27/2022    SPECGRAV >=1 030 04/27/2022    PHUR 5 5 04/27/2022    LEUKOCYTESUR Negative 04/27/2022    NITRITE Negative 04/27/2022    GLUCOSEU Negative 04/27/2022    KETONESU Negative 04/27/2022    BILIRUBINUR Negative 04/27/2022    BLOODU Negative 04/27/2022        Urine Culture: No results found for: URINECX   Wound Culure: No results found for: WOUNDCULT  Blood Culture: No results found for: BLOODCX      Physical Exam:  General Appearance:    Alert and orientated x 3, cooperative, no distress, appears stated age   Lungs:     Clear to auscultation bilaterally, respirations unlabored, no wheezes    Heart:    Regular rate and rhythm, S1 and S2 normal, no murmur   Abdomen:    Normoactive BS, soft, abdomen soft but distended, tympanic, non tender, non rigid, no masses, no palpated organomegaly   Extremities:  Extremities normal, no calf tenderness, no cyanosis or edema   Pulses:   2+ and symmetric all extremities   Skin:   Skin color, texture, turgor normal, no rashes   Neurologic:   CNII-XII intact, normal strength, affect appropriate       Imaging:  CT abdomen pelvis with contrast    Result Date: 4/27/2022  Impression: Small bowel obstruction with the transition point in the right side of the abdomen where there is a loop of thickened small bowel with surrounding mild inflammation  This could reflect focal enteritis although underlying no bowel lesion cannot be excluded  Clinical correlation and follow-up advised   Workstation performed: [id number]       VTE Pharmacologic Prophylaxis: Heparin  VTE Mechanical Prophylaxis: sequential compression device    Sharmin Ernandez PA-C   4/29/2022

## 2022-04-29 NOTE — UTILIZATION REVIEW
Initial Clinical Review    Admission: Date/Time/Statement:   Admission Orders (From admission, onward)     Ordered        04/27/22 1855  Inpatient Admission  Once                      Orders Placed This Encounter   Procedures    Inpatient Admission     Standing Status:   Standing     Number of Occurrences:   1     Order Specific Question:   Level of Care     Answer:   Med Surg [16]     Order Specific Question:   Estimated length of stay     Answer:   More than 2 Midnights     Order Specific Question:   Certification     Answer:   I certify that inpatient services are medically necessary for this patient for a duration of greater than two midnights  See H&P and MD Progress Notes for additional information about the patient's course of treatment  ED Arrival Information     Expected Arrival Acuity    - 4/27/2022 15:32 Urgent         Means of arrival Escorted by Service Admission type    Walk-In Family Member Surgery-General Urgent         Arrival complaint    Abdominal Pain,Vomiting,Weakness         Chief Complaint   Patient presents with    Abdominal Pain     Patient c/o mid center abdominal pain  Patient c/o nausea and vomitting  Initial Presentation: 59 y o  female urgently to ED as Inpatient admission to surgical service for evaluation & treatment of Small Bowel Obstruction    PMH  Several ABD surgeries reports waxing & waning ABD pain located at periumbilical area radiating across bilaterally worse w movement; associated symptom of nausea w several vomiting events night prior  Reports constipation on stool softeners w small BM this am no flatus  EXAM  ABD distention & tympanic to percussion, hypoactive BS, no jaundice  resp clear  CT abd/pelvis reveal Small bowel obstruction   Plan to have the nurse place NG tube and admit to surgery service    Date: 4/28/2022   Day 2:   SURGERY MD PLAN No acute surgical intervention   Cont NGT, NGT w 1 L output overnight;   exam:  abd pain improving but cont w distention, hypoactive BS; cont IVF may have ICE chips; OOB, OK to clamps NGT for ambulation & reconnect to SX w increase symptoms of abd pain, distention, N/V  Analgesic/anti emetics   Cont serial labs & exams  If no improvement in am will obtain ABD XR &/or consider small bowel follow thru study   ED Triage Vitals   Temperature Pulse Respirations Blood Pressure SpO2   04/27/22 1541 04/27/22 1541 04/27/22 1541 04/27/22 1541 04/27/22 1541   97 9 °F (36 6 °C) 98 20 125/85 96 %      Temp Source Heart Rate Source Patient Position - Orthostatic VS BP Location FiO2 (%)   04/27/22 1541 04/27/22 1541 04/27/22 1541 04/27/22 1541 --   Oral Monitor Sitting Left arm       Pain Score       04/27/22 1858       8          Wt Readings from Last 1 Encounters:   04/27/22 83 kg (183 lb)     Additional Vital Signs:   Date/Time Temp Pulse Resp BP MAP (mmHg) SpO2 O2 Device Patient Position - Orthostatic VS   04/29/22 0900 -- -- -- -- -- 94 % -- --   04/29/22 0857 -- -- -- -- -- -- None (Room air) --   04/29/22 08:24:44 -- 101 -- -- -- 91 % -- --   04/29/22 07:30:18 97 7 °F (36 5 °C) 95 17 112/72 85 89 % Abnormal  -- --   04/28/22 22:54:22 97 9 °F (36 6 °C) -- 18 122/71 88 -- -- --   04/28/22 16:11:51 -- 105 -- 124/70 88 90 % -- --   04/28/22 08:01:20 97 7 °F (36 5 °C) 101 -- 103/68 80 90 % -- --   04/28/22 00:13:03 97 6 °F (36 4 °C) -- 18 106/70 82 -- -- --   04/27/22 21:57:29 97 3 °F (36 3 °C) Abnormal  87 18 127/82 97 89 % Abnormal  -- --   04/27/22 2130 -- 91 18 119/74 -- 94 % None (Room air) Sitting   04/27/22 2112 -- 94 18 132/67 -- 98 % None (Room air) --   04/27/22 1900 -- 98 18 132/67 -- 98 % None (Room air) --   04/27/22 1647 -- -- -- -- -- -- None (Room air) --   04/27/22 1541 97 9 °F (36 6 °C) 98 20 125/85 -- 96 % None (Room air) Sitting       Weights (last 14 days)    Date/Time Weight Weight Method Height   04/27/22 2130 83 kg (183 lb) Stated 5' 8" (1 727 m)       Pertinent Labs/Diagnostic Test Results:   4/27 ekg NSR   CT abdomen pelvis with contrast   Final Result by Tessie Alcaraz MD (04/27 1828)      Small bowel obstruction with the transition point in the right side of the abdomen where there is a loop of thickened small bowel with surrounding mild inflammation  This could reflect focal enteritis although underlying no bowel lesion cannot be    excluded  Clinical correlation and follow-up advised  Results from last 7 days   Lab Units 04/29/22  0429 04/28/22  1031 04/27/22  2229 04/27/22  1549 04/27/22  1549   WBC Thousand/uL 6 65 7 44  --   --  10 26*   HEMOGLOBIN g/dL 15 1 15 4  --   --  17 0*   HEMATOCRIT % 44 3 45 1  --   --  47 9*   PLATELETS Thousands/uL 260 266 265   < > 292   NEUTROS ABS Thousands/µL  --  5 29  --   --  8 00*   BANDS PCT % 9*  --   --   --   --     < > = values in this interval not displayed           Results from last 7 days   Lab Units 04/29/22  0429 04/28/22  1031 04/27/22  1549   SODIUM mmol/L 133* 134* 136   POTASSIUM mmol/L 4 1 4 6 4 6   CHLORIDE mmol/L 100 100 99*   CO2 mmol/L 27 30 27   ANION GAP mmol/L 6 4 10   BUN mg/dL 21 24 28*   CREATININE mg/dL 0 67 0 95 0 93   EGFR ml/min/1 73sq m 93 63 65   CALCIUM mg/dL 8 7 9 0 10 0   MAGNESIUM mg/dL  --  1 9  --      Results from last 7 days   Lab Units 04/27/22  1549   AST U/L 21   ALT U/L 25   ALK PHOS U/L 100   TOTAL PROTEIN g/dL 8 2   ALBUMIN g/dL 4 2   TOTAL BILIRUBIN mg/dL 1 34*         Results from last 7 days   Lab Units 04/29/22  0429 04/28/22  1031 04/27/22  1549   GLUCOSE RANDOM mg/dL 143* 111 134             No results found for: BETA-HYDROXYBUTYRATE                   Results from last 7 days   Lab Units 04/27/22  1549   HS TNI 0HR ng/L 3           Results from last 7 days   Lab Units 04/27/22  1549   LIPASE u/L 149                 Results from last 7 days   Lab Units 04/27/22  1601   CLARITY UA  Clear   COLOR UA  Yellow   SPEC GRAV UA  >=1 030   PH UA  5 5   GLUCOSE UA mg/dl Negative   KETONES UA mg/dl Negative   BLOOD UA  Negative PROTEIN UA mg/dl Negative   NITRITE UA  Negative   BILIRUBIN UA  Negative   UROBILINOGEN UA E U /dl 0 2   LEUKOCYTES UA  Negative         ED Treatment:   Medication Administration from 04/27/2022 1532 to 04/27/2022 2150       Date/Time Order Dose Route Action     04/27/2022 1627 sodium chloride 0 9 % bolus 1,000 mL 1,000 mL Intravenous New Bag     04/27/2022 1628 ondansetron (ZOFRAN) injection 4 mg 4 mg Intravenous Given     04/27/2022 1628 famotidine (PEPCID) injection 20 mg 20 mg Intravenous Given     04/27/2022 1858 morphine (PF) 4 mg/mL injection 4 mg 4 mg Intravenous Given     04/27/2022 1927 lactated ringers infusion 125 mL/hr Intravenous New Bag        History reviewed  No pertinent past medical history  Present on Admission:   Small bowel obstruction ([facility] [address] )      Admitting Diagnosis: Vomiting [R11 10]  SBO (small bowel obstruction) ([facility]) [K56 609]  Weakness [R53 1]  Abdominal pain [R10 9]  Age/Sex: 59 y o  female  Admission Orders:  NPO  NGT to Low inter SX  Ok to clamp NGT  I/O  OOB    Scheduled Medications:  heparin (porcine), 5,000 Units, Subcutaneous, Q8H [address]      Continuous IV Infusions:  dextrose 5 % and sodium chloride 0 45 % with KCl 20 mEq/L, 125 mL/hr, Intravenous, Continuous      PRN Meds:  acetaminophen, 650 mg, Per NG Tube, Q6H PRN  calcium carbonate, 1,000 mg, Oral, Daily PRN  morphine injection, 2 mg, Intravenous, Q4H PRN  ondansetron, 4 mg, Intravenous, Q6H PRN    Network Utilization Review Department  ATTENTION: Please call with any questions or concerns to [phone] and carefully listen to the prompts so that you are directed to the right person  All voicemails are confidential   Didi Dave all requests for admission clinical reviews, approved or denied determinations and any other requests to dedicated fax number below belonging to the campus where the patient is receiving treatment   List of dedicated fax numbers for the Facilities:  FACILITY NAME UR FAX NUMBER   ADMISSION DENIALS (Administrative/Medical Necessity) [phone]   [address] (Maternity/NICU/Pediatrics) [address] [facility] [facility] [address] [facility]  [phone]   [address] [facility] [place] [address] [id number] [place] [address] [address] Chanda [address] [facility] [address] [facility] [phone]

## 2022-04-29 NOTE — PLAN OF CARE
Problem: PAIN - ADULT  Goal: Verbalizes/displays adequate comfort level or baseline comfort level  Description: Interventions:  - Encourage patient to monitor pain and request assistance  - Assess pain using appropriate pain scale  - Administer analgesics based on type and severity of pain and evaluate response  - Implement non-pharmacological measures as appropriate and evaluate response  - Consider cultural and social influences on pain and pain management  - Notify physician/advanced practitioner if interventions unsuccessful or patient reports new pain  Outcome: Progressing     Problem: INFECTION - ADULT  Goal: Absence or prevention of progression during hospitalization  Description: INTERVENTIONS:  - Assess and monitor for signs and symptoms of infection  - Monitor lab/diagnostic results  - Monitor all insertion sites, i e  indwelling lines, tubes, and drains  - Monitor endotracheal if appropriate and nasal secretions for changes in amount and color  - Elmer appropriate cooling/warming therapies per order  - Administer medications as ordered  - Instruct and encourage patient and family to use good hand hygiene technique  - Identify and instruct in appropriate isolation precautions for identified infection/condition  Outcome: Progressing  Goal: Absence of fever/infection during neutropenic period  Description: INTERVENTIONS:  - Monitor WBC    Outcome: Progressing     Problem: DISCHARGE PLANNING  Goal: Discharge to home or other facility with appropriate resources  Description: INTERVENTIONS:  - Identify barriers to discharge w/patient and caregiver  - Arrange for needed discharge resources and transportation as appropriate  - Identify discharge learning needs (meds, wound care, etc )  - Arrange for interpretive services to assist at discharge as needed  - Refer to Case Management Department for coordinating discharge planning if the patient needs post-hospital services based on physician/advanced practitioner order or complex needs related to functional status, cognitive ability, or social support system  Outcome: Progressing     Problem: Knowledge Deficit  Goal: Patient/family/caregiver demonstrates understanding of disease process, treatment plan, medications, and discharge instructions  Description: Complete learning assessment and assess knowledge base    Interventions:  - Provide teaching at level of understanding  - Provide teaching via preferred learning methods  Outcome: Progressing     Problem: Prexisting or High Potential for Compromised Skin Integrity  Goal: Skin integrity is maintained or improved  Description: INTERVENTIONS:  - Identify patients at risk for skin breakdown  - Assess and monitor skin integrity  - Assess and monitor nutrition and hydration status  - Monitor labs   - Assess for incontinence   - Turn and reposition patient  - Assist with mobility/ambulation  - Relieve pressure over bony prominences  - Avoid friction and shearing  - Provide appropriate hygiene as needed including keeping skin clean and dry  - Evaluate need for skin moisturizer/barrier cream  - Collaborate with interdisciplinary team   - Patient/family teaching  - Consider wound care consult   Outcome: Progressing

## 2022-04-29 NOTE — CASE MANAGEMENT
Case Management Assessment & Discharge Planning Note    Patient name Roger Yancey  Location /-80 MRN [id number]  : 1960 Date 2022       Current Admission Date: 2022  Current Admission Diagnosis:Small bowel obstruction [facility])   Patient Active Problem List    Diagnosis Date Noted    Small bowel obstruction ([address] ) 2022      LOS (days): 2  Geometric Mean LOS (GMLOS) (days):   Days to GMLOS:     OBJECTIVE:    Risk of Unplanned Readmission Score: 5         Current admission status: Inpatient       Preferred Pharmacy:   [address] [place] [address]  Phone: [phone] Fax: [phone]    Primary Care Provider: Dimple Allen DO    Primary Insurance: BLUE CROSS  Secondary Insurance:     ASSESSMENT:  [address] Representative - Spouse   Primary Phone: [phone] (Home)               Advance Directives  Does patient have a [address]?: No  Was patient offered paperwork?: Yes (POA/AD paperwork provided)  Does patient currently have a Health Care decision maker?: Yes, please see Health Care Proxy section  Does patient have Advance Directives?: No  Was patient offered paperwork?: Yes (See above)  Primary Contact: Spouse David         Readmission Root Cause  30 Day Readmission: No    Patient Information  Admitted from[de-identified] Home  Mental Status: Alert  During Assessment patient was accompanied by: Not accompanied during assessment  Assessment information provided by[de-identified] Patient  Primary Caregiver: Self  Support Systems: Spouse/significant [address] [facility] of Residence: [address] do you live in?: [facility] entry access options   Select all that apply : Stairs  Number of steps to enter home : 4  Type of Current Residence: 2 story home (Patient denies any issues navigating her home environment)  Upon entering residence, is there a bedroom on the main floor (no further steps)?: No  A bedroom is located on the following floor levels of residence (select all that apply):: 2nd Floor  Upon entering residence, is there a bathroom on the main floor (no further steps)?: Yes  Number of steps to 2nd floor from main floor: One Flight  In the last 12 months, was there a time when you were not able to pay the mortgage or rent on time?: No  In the last 12 months, how many places have you lived?: 1  In the last 12 months, was there a time when you did not have a steady place to sleep or slept in a shelter (including now)?: No  Living Arrangements: Lives w/ Spouse/significant other (+ 2 children)    Activities of Daily Living Prior to Admission  Functional Status: Independent  Completes ADLs independently?: Yes  Ambulates independently?: Yes  Does patient use assisted devices?: No  Does patient currently own DME?: No  Does patient have a history of Outpatient Therapy (PT/OT)?: No  Does the patient have a history of Short-Term Rehab?: No  Does patient have a history of HHC?: No     Patient Information Continued  Does patient have prescription coverage?: Yes  Within the past 12 months, you worried that your food would run out before you got the money to buy more : Never true  Within the past 12 months, the food you bought just didnt last and you didnt have money to get more : Never true  Does patient receive dialysis treatments?: No  Does patient have a history of substance abuse?: No  Does patient have a history of Mental Health Diagnosis?: No     Means of Transportation  Means of Transport to Appts[de-identified] Drives Self  In the past 12 months, has lack of transportation kept you from medical appointments or from getting medications?: No  In the past 12 months, has lack of transportation kept you from meetings, work, or from getting things needed for daily living?: No    DISCHARGE DETAILS:    Discharge planning discussed with[de-identified] patient at bedside  [place] of Choice: Yes  Comments - Freedom of Choice: CM met with patient at bedside this am to introduce self/role  Patient alert and oriented and OOB in recliner  Patient reports independence with ADLs/mobility at baseline  POA/AD paperwork reviewed and provided at her request  Patient denies any other CM needs at this time  She currently has NGT to intermittent suction; surgery is primary  CM Dept will continue to follow through dc    CM contacted family/caregiver?: No- see comments (Patient will update family herself )  Were Treatment Team discharge recommendations reviewed with patient/caregiver?: Yes  Did patient/caregiver verbalize understanding of patient care needs?: Yes  Were patient/caregiver advised of the risks associated with not following Treatment Team discharge recommendations?: Yes     [address]         Is the [place] interested [place] [address] at discharge?: No    DME Referral Provided  Referral made for DME?: No    Other Referral/Resources/Interventions Provided:  Interventions: Advanced Directives    Would you like to participate in our [address] service program?  : No - Declined    Treatment Team Recommendation: Home  Discharge Destination Plan[de-identified] Home  Transport at Discharge : Family

## 2022-04-30 LAB
ANION GAP SERPL CALCULATED.3IONS-SCNC: 8 MMOL/L (ref 4–13)
BUN SERPL-MCNC: 19 MG/DL (ref 5–25)
CALCIUM SERPL-MCNC: 8.9 MG/DL (ref 8.3–10.1)
CHLORIDE SERPL-SCNC: 95 MMOL/L (ref 100–108)
CO2 SERPL-SCNC: 28 MMOL/L (ref 21–32)
CREAT SERPL-MCNC: 0.66 MG/DL (ref 0.6–1.3)
ERYTHROCYTE [DISTWIDTH] IN BLOOD BY AUTOMATED COUNT: 11.8 % (ref 11.6–15.1)
GFR SERPL CREATININE-BSD FRML MDRD: 94 ML/MIN/1.73SQ M
GLUCOSE SERPL-MCNC: 131 MG/DL (ref 65–140)
HCT VFR BLD AUTO: 42.9 % (ref 34.8–46.1)
HGB BLD-MCNC: 15 G/DL (ref 11.5–15.4)
MCH RBC QN AUTO: 31.9 PG (ref 26.8–34.3)
MCHC RBC AUTO-ENTMCNC: 35 G/DL (ref 31.4–37.4)
MCV RBC AUTO: 91 FL (ref 82–98)
NRBC BLD AUTO-RTO: 0 /100 WBCS
PLATELET # BLD AUTO: 281 THOUSANDS/UL (ref 149–390)
PMV BLD AUTO: 9.8 FL (ref 8.9–12.7)
POTASSIUM SERPL-SCNC: 4 MMOL/L (ref 3.5–5.3)
RBC # BLD AUTO: 4.7 MILLION/UL (ref 3.81–5.12)
SODIUM SERPL-SCNC: 131 MMOL/L (ref 136–145)
WBC # BLD AUTO: 6.75 THOUSAND/UL (ref 4.31–10.16)

## 2022-04-30 PROCEDURE — 99232 SBSQ HOSP IP/OBS MODERATE 35: CPT | Performed by: SURGERY

## 2022-04-30 PROCEDURE — 80048 BASIC METABOLIC PNL TOTAL CA: CPT | Performed by: PHYSICIAN ASSISTANT

## 2022-04-30 PROCEDURE — 85027 COMPLETE CBC AUTOMATED: CPT | Performed by: PHYSICIAN ASSISTANT

## 2022-04-30 RX ORDER — LIDOCAINE HYDROCHLORIDE 20 MG/ML
1 JELLY TOPICAL ONCE
Status: COMPLETED | OUTPATIENT
Start: 2022-04-30 | End: 2022-04-30

## 2022-04-30 RX ADMIN — HEPARIN SODIUM 5000 UNITS: 5000 INJECTION INTRAVENOUS; SUBCUTANEOUS at 05:00

## 2022-04-30 RX ADMIN — LIDOCAINE HYDROCHLORIDE 1 APPLICATION: 20 JELLY TOPICAL at 16:42

## 2022-04-30 RX ADMIN — DEXTROSE, SODIUM CHLORIDE, AND POTASSIUM CHLORIDE 125 ML/HR: 5; .45; .15 INJECTION INTRAVENOUS at 09:11

## 2022-04-30 RX ADMIN — HEPARIN SODIUM 5000 UNITS: 5000 INJECTION INTRAVENOUS; SUBCUTANEOUS at 13:42

## 2022-04-30 RX ADMIN — ONDANSETRON 4 MG: 2 INJECTION INTRAMUSCULAR; INTRAVENOUS at 04:58

## 2022-04-30 RX ADMIN — ONDANSETRON 4 MG: 2 INJECTION INTRAMUSCULAR; INTRAVENOUS at 16:42

## 2022-04-30 NOTE — PROGRESS NOTES
Progress Note - General Surgery   Keyonjaja Mi 59 y o  female MRN: [id number]  Unit/Bed#: -01 Encounter: [id number]    Assessment/Plan  Jesica Reis is a 59 y o  female     Small bowel obstruction  AVSS, WBC 6 75  3x episodes of emesis overnight following removal of NGT  Abdomen firm and distended this AM, hypoactive bowel sounds    Downgrade diet back to NPO, reinsert NGT given multiple episodes of emesis  Hook NGT back up to low intermittent wall suction  Continue IVF hydration   If patient fails to improve in next 24-48 hours, may require surgical intervention for non-resolving small bowel obstruction, the patient is aware of plan and in agreement   Serial abdominal exams  Encourage ambulation/OOB  Trend labs, monitor vitals, replete electrolytes as needed   Pain control, antiemetics PRN  DVT prophylaxis    Subjective/Objective    Subjective: Multiple episodes of emesis overnight    Objective:     Blood pressure 127/85, pulse 105, temperature 98 °F (36 7 °C), resp  rate 20, height 5' 8" (1 727 m), weight 83 kg (183 lb), SpO2 91 %  ,Body mass index is 27 83 kg/m²        Intake/Output Summary (Last 24 hours) at 4/30/2022 1219  Last data filed at 4/30/2022 0956  Gross per 24 hour   Intake 2937 08 ml   Output 250 ml   Net 2687 08 ml       Invasive Devices  Report    Peripheral Intravenous Line            Peripheral IV 04/27/22 Right Antecubital 2 days                Physical Exam: /85   Pulse 105   Temp 98 °F (36 7 °C)   Resp 20   Ht 5' 8" (1 727 m)   Wt 83 kg (183 lb)   SpO2 91%   BMI 27 83 kg/m²   General appearance: alert and oriented, in no acute distress  Lungs: clear to auscultation bilaterally  Heart: regular rate and rhythm, S1, S2 normal, no murmur, click, rub or gallop  Abdomen: firm, distended, hypoactive bowel sounds, diffuse soreness to palpation, no guarding or rebound, no peritoneal signs  Extremities: extremities normal, warm and well-perfused; no cyanosis, clubbing, or edema    Lab, Imaging and other studies:I have personally reviewed pertinent lab results       VTE Pharmacologic Prophylaxis: Heparin  VTE Mechanical Prophylaxis: sequential compression device    Recent Results (from the past 36 hour(s))   CBC and differential    Collection Time: 04/29/22  4:29 AM   Result Value Ref Range    WBC 6 65 4 31 - 10 16 Thousand/uL    RBC 4 71 3 81 - 5 12 Million/uL    Hemoglobin 15 1 11 5 - 15 4 g/dL    Hematocrit 44 3 34 8 - 46 1 %    MCV 94 82 - 98 fL    MCH 32 1 26 8 - 34 3 pg    MCHC 34 1 31 4 - 37 4 g/dL    RDW 11 7 11 6 - 15 1 %    MPV 9 5 8 9 - 12 7 fL    Platelets 394 967 - 797 Thousands/uL   Basic metabolic panel    Collection Time: 04/29/22  4:29 AM   Result Value Ref Range    Sodium 133 (L) 136 - 145 mmol/L    Potassium 4 1 3 5 - 5 3 mmol/L    Chloride 100 100 - 108 mmol/L    CO2 27 21 - 32 mmol/L    ANION GAP 6 4 - 13 mmol/L    BUN 21 5 - 25 mg/dL    Creatinine 0 67 0 60 - 1 30 mg/dL    Glucose 143 (H) 65 - 140 mg/dL    Calcium 8 7 8 3 - 10 1 mg/dL    eGFR 93 ml/min/1 73sq m   Manual Differential(PHLEBS Do Not Order)    Collection Time: 04/29/22  4:29 AM   Result Value Ref Range    Segmented % 48 43 - 75 %    Bands % 9 (H) 0 - 8 %    Lymphocytes % 23 14 - 44 %    Monocytes % 16 (H) 4 - 12 %    Eosinophils, % 0 0 - 6 %    Basophils % 1 0 - 1 %    Metamyelocytes% 1 0 - 1 %    Atypical Lymphocytes % 2 (H) <=0 %    Absolute Neutrophils 3 79 1 85 - 7 62 Thousand/uL    Lymphocytes Absolute 1 53 0 60 - 4 47 Thousand/uL    Monocytes Absolute 1 06 0 00 - 1 22 Thousand/uL    Eosinophils Absolute 0 00 0 00 - 0 40 Thousand/uL    Basophils Absolute 0 07 0 00 - 0 10 Thousand/uL    Total Counted      Platelet Estimate Adequate Adequate    Large Platelet Present    CBC and differential    Collection Time: 04/30/22  5:55 AM   Result Value Ref Range    WBC 6 75 4 31 - 10 16 Thousand/uL    RBC 4 70 3 81 - 5 12 Million/uL    Hemoglobin 15 0 11 5 - 15 4 g/dL    Hematocrit 42 9 34 8 - 46 1 %    MCV 91 82 - 98 fL    MCH 31 9 26 8 - 34 3 pg    MCHC 35 0 31 4 - 37 4 g/dL    RDW 11 8 11 6 - 15 1 %    MPV 9 8 8 9 - 12 7 fL    Platelets 718 931 - 603 Thousands/uL    nRBC 0 /100 WBCs   Basic metabolic panel    Collection Time: 04/30/22  5:55 AM   Result Value Ref Range    Sodium 131 (L) 136 - 145 mmol/L    Potassium 4 0 3 5 - 5 3 mmol/L    Chloride 95 (L) 100 - 108 mmol/L    CO2 28 21 - 32 mmol/L    ANION GAP 8 4 - 13 mmol/L    BUN 19 5 - 25 mg/dL    Creatinine 0 66 0 60 - 1 30 mg/dL    Glucose 131 65 - 140 mg/dL    Calcium 8 9 8 3 - 10 1 mg/dL    eGFR 94 ml/min/1 73sq m

## 2022-04-30 NOTE — PLAN OF CARE
Problem: Potential for Falls  Goal: Patient will remain free of falls  Description: INTERVENTIONS:  - Educate patient/family on patient safety including physical limitations  - Instruct patient to call for assistance with activity   - Consult OT/PT to assist with strengthening/mobility   - Keep Call bell within reach  - Keep bed low and locked with side rails adjusted as appropriate  - Keep care items and personal belongings within reach  - Initiate and maintain comfort rounds  - Make Fall Risk Sign visible to staff  - Offer Toileting every  Hours, in advance of need  - Initiate/Maintain alarm  - Obtain necessary fall risk management equipment:   - Apply yellow socks and bracelet for high fall risk patients  - Consider moving patient to room near nurses station  Outcome: Progressing     Problem: PAIN - ADULT  Goal: Verbalizes/displays adequate comfort level or baseline comfort level  Description: Interventions:  - Encourage patient to monitor pain and request assistance  - Assess pain using appropriate pain scale  - Administer analgesics based on type and severity of pain and evaluate response  - Implement non-pharmacological measures as appropriate and evaluate response  - Consider cultural and social influences on pain and pain management  - Notify physician/advanced practitioner if interventions unsuccessful or patient reports new pain  Outcome: Progressing     Problem: INFECTION - ADULT  Goal: Absence or prevention of progression during hospitalization  Description: INTERVENTIONS:  - Assess and monitor for signs and symptoms of infection  - Monitor lab/diagnostic results  - Monitor all insertion sites, i e  indwelling lines, tubes, and drains  - Monitor endotracheal if appropriate and nasal secretions for changes in amount and color  - [place] appropriate cooling/warming therapies per order  - Administer medications as ordered  - Instruct and encourage patient and family to use good hand hygiene technique  - Identify and instruct in appropriate isolation precautions for identified infection/condition  Outcome: Progressing  Goal: Absence of fever/infection during neutropenic period  Description: INTERVENTIONS:  - Monitor WBC    Outcome: Progressing     Problem: SAFETY ADULT  Goal: Patient will remain free of falls  Description: INTERVENTIONS:  - Educate patient/family on patient safety including physical limitations  - Instruct patient to call for assistance with activity   - Consult OT/PT to assist with strengthening/mobility   - Keep Call bell within reach  - Keep bed low and locked with side rails adjusted as appropriate  - Keep care items and personal belongings within reach  - Initiate and maintain comfort rounds  - Make Fall Risk Sign visible to staff  - Offer Toileting every  Hours, in advance of need  - Initiate/Maintainalarm  - Obtain necessary fall risk management equipment:   - Apply yellow socks and bracelet for high fall risk patients  - Consider moving patient to room near nurses station  Outcome: Progressing  Goal: Maintain or return to baseline ADL function  Description: INTERVENTIONS:  -  Assess patient's ability to carry out ADLs; assess patient's baseline for ADL function and identify physical deficits which impact ability to perform ADLs (bathing, care of mouth/teeth, toileting, grooming, dressing, etc )  - Assess/evaluate cause of self-care deficits   - Assess range of motion  - Assess patient's mobility; develop plan if impaired  - Assess patient's need for assistive devices and provide as appropriate  - Encourage maximum independence but intervene and supervise when necessary  - Involve family in performance of ADLs  - Assess for home care needs following discharge   - Consider OT consult to assist with ADL evaluation and planning for discharge  - Provide patient education as appropriate  Outcome: Progressing  Goal: Maintains/Returns to pre admission functional level  Description: INTERVENTIONS:  - Perform BMAT or MOVE assessment daily    - Set and communicate daily mobility goal to care team and patient/family/caregiver  - Collaborate with rehabilitation services on mobility goals if consulted  - Perform Range of Motion  times a day  - Reposition patient every  hours  - Dangle patient  times a day  - Stand patient  times a day  - Ambulate patient  times a day  - Out of bed to chair  times a day   - Out of bed for meals times a day  - Out of bed for toileting  - Record patient progress and toleration of activity level   Outcome: Progressing     Problem: DISCHARGE PLANNING  Goal: Discharge to home or other facility with appropriate resources  Description: INTERVENTIONS:  - Identify barriers to discharge w/patient and caregiver  - Arrange for needed discharge resources and transportation as appropriate  - Identify discharge learning needs (meds, wound care, etc )  - Arrange for interpretive services to assist at discharge as needed  - Refer to Case Management Department for coordinating discharge planning if the patient needs post-hospital services based on physician/advanced practitioner order or complex needs related to functional status, cognitive ability, or social support system  Outcome: Progressing     Problem: Knowledge Deficit  Goal: Patient/family/caregiver demonstrates understanding of disease process, treatment plan, medications, and discharge instructions  Description: Complete learning assessment and assess knowledge base    Interventions:  - Provide teaching at level of understanding  - Provide teaching via preferred learning methods  Outcome: Progressing     Problem: Prexisting or High Potential for Compromised Skin Integrity  Goal: Skin integrity is maintained or improved  Description: INTERVENTIONS:  - Identify patients at risk for skin breakdown  - Assess and monitor skin integrity  - Assess and monitor nutrition and hydration status  - Monitor labs   - Assess for incontinence   - Turn and reposition patient  - Assist with mobility/ambulation  - Relieve pressure over bony prominences  - Avoid friction and shearing  - Provide appropriate hygiene as needed including keeping skin clean and dry  - Evaluate need for skin moisturizer/barrier cream  - Collaborate with interdisciplinary team   - Patient/family teaching  - Consider wound care consult   Outcome: Progressing

## 2022-04-30 NOTE — PLAN OF CARE
Problem: Potential for Falls  Goal: Patient will remain free of falls  Description: INTERVENTIONS:  - Educate patient/family on patient safety including physical limitations  - Instruct patient to call for assistance with activity   - Consult OT/PT to assist with strengthening/mobility   - Keep Call bell within reach  - Keep bed low and locked with side rails adjusted as appropriate  - Keep care items and personal belongings within reach  - Initiate and maintain comfort rounds  - Make Fall Risk Sign visible to staff  - Apply yellow socks and bracelet for high fall risk patients  - Consider moving patient to room near nurses station  4/30/2022 1219 by [place], RN  Outcome: Progressing  4/30/2022 1216 by [place], RN  Outcome: Progressing     Problem: PAIN - ADULT  Goal: Verbalizes/displays adequate comfort level or baseline comfort level  Description: Interventions:  - Encourage patient to monitor pain and request assistance  - Assess pain using appropriate pain scale  - Administer analgesics based on type and severity of pain and evaluate response  - Implement non-pharmacological measures as appropriate and evaluate response  - Consider cultural and social influences on pain and pain management  - Notify physician/advanced practitioner if interventions unsuccessful or patient reports new pain  4/30/2022 1219 by [place], RN  Outcome: Progressing  4/30/2022 1216 by [place], RN  Outcome: Progressing     Problem: INFECTION - ADULT  Goal: Absence or prevention of progression during hospitalization  Description: INTERVENTIONS:  - Assess and monitor for signs and symptoms of infection  - Monitor lab/diagnostic results  - Monitor all insertion sites, i e  indwelling lines, tubes, and drains  - [place] appropriate cooling/warming therapies per order  - Administer medications as ordered  - Instruct and encourage patient and family to use good hand hygiene technique  - Identify and instruct in appropriate isolation precautions for identified infection/condition  4/30/2022 1219 by Raul Pierson RN  Outcome: Progressing  4/30/2022 1216 by Raul Pierson RN  Outcome: Progressing  Goal: Absence of fever/infection during neutropenic period  Description: INTERVENTIONS:  - Monitor WBC    4/30/2022 1219 by Raul Pierson RN  Outcome: Progressing  4/30/2022 1216 by Raul Pierson RN  Outcome: Progressing     Problem: SAFETY ADULT  Goal: Patient will remain free of falls  Description: INTERVENTIONS:  - Educate patient/family on patient safety including physical limitations  - Instruct patient to call for assistance with activity   - Consult OT/PT to assist with strengthening/mobility   - Keep Call bell within reach  - Keep bed low and locked with side rails adjusted as appropriate  - Keep care items and personal belongings within reach  - Initiate and maintain comfort rounds  - Make Fall Risk Sign visible to staff  - Apply yellow socks and bracelet for high fall risk patients  - Consider moving patient to room near nurses station  4/30/2022 1219 by Raul Pierson RN  Outcome: Progressing  4/30/2022 1216 by Raul Pierson RN  Outcome: Progressing     Problem: DISCHARGE PLANNING  Goal: Discharge to home or other facility with appropriate resources  Description: INTERVENTIONS:  - Identify barriers to discharge w/patient and caregiver  - Arrange for needed discharge resources and transportation as appropriate  - Identify discharge learning needs (meds, wound care, etc )  - Arrange for interpretive services to assist at discharge as needed  - Refer to Case Management Department for coordinating discharge planning if the patient needs post-hospital services based on physician/advanced practitioner order or complex needs related to functional status, cognitive ability, or social support system  4/30/2022 1219 by Raul Pierson RN  Outcome: Progressing  4/30/2022 1216 by Raul Pierson RN  Outcome: Progressing     Problem: Knowledge Deficit  Goal: Patient/family/caregiver demonstrates understanding of disease process, treatment plan, medications, and discharge instructions  Description: Complete learning assessment and assess knowledge base    Interventions:  - Provide teaching at level of understanding  - Provide teaching via preferred learning methods  4/30/2022 1219 by [place], RN  Outcome: Progressing  4/30/2022 1216 by [place], RN  Outcome: Progressing     Problem: Prexisting or High Potential for Compromised Skin Integrity  Goal: Skin integrity is maintained or improved  Description: INTERVENTIONS:  - Identify patients at risk for skin breakdown  - Assess and monitor skin integrity  - Assess and monitor nutrition and hydration status  - Monitor labs   - Assess for incontinence   - Turn and reposition patient  - Assist with mobility/ambulation  - Relieve pressure over bony prominences  - Avoid friction and shearing  - Provide appropriate hygiene as needed including keeping skin clean and dry  - Evaluate need for skin moisturizer/barrier cream  - Collaborate with interdisciplinary team   - Patient/family teaching  - Consider wound care consult   4/30/2022 1219 by [place], RN  Outcome: Progressing  4/30/2022 1216 by [place], RN  Outcome: Progressing     Problem: GASTROINTESTINAL - ADULT  Goal: Minimal or absence of nausea and/or vomiting  Description: INTERVENTIONS:  - Administer IV fluids if ordered to ensure adequate hydration  - Administer ordered antiemetic medications as needed  - Provide nonpharmacologic comfort measures as appropriate  - Advance diet as tolerated, if ordered  - Consider nutrition services referral to assist patient with adequate nutrition and appropriate food choices  Outcome: Progressing  Goal: Maintains or returns to baseline bowel function  Description: INTERVENTIONS:  - Assess bowel function  - Encourage oral fluids to ensure adequate hydration  - Administer IV fluids if ordered to ensure adequate hydration  - Administer ordered medications as needed  - Encourage mobilization and activity  - Consider nutritional services referral to assist patient with adequate nutrition and appropriate food choices  Outcome: Progressing

## 2022-05-01 LAB
ANION GAP SERPL CALCULATED.3IONS-SCNC: 7 MMOL/L (ref 4–13)
BASOPHILS # BLD AUTO: 0.04 THOUSANDS/ΜL (ref 0–0.1)
BASOPHILS NFR BLD AUTO: 1 % (ref 0–1)
BUN SERPL-MCNC: 20 MG/DL (ref 5–25)
CALCIUM SERPL-MCNC: 8.8 MG/DL (ref 8.3–10.1)
CHLORIDE SERPL-SCNC: 96 MMOL/L (ref 100–108)
CO2 SERPL-SCNC: 30 MMOL/L (ref 21–32)
CREAT SERPL-MCNC: 0.65 MG/DL (ref 0.6–1.3)
EOSINOPHIL # BLD AUTO: 0.07 THOUSAND/ΜL (ref 0–0.61)
EOSINOPHIL NFR BLD AUTO: 1 % (ref 0–6)
ERYTHROCYTE [DISTWIDTH] IN BLOOD BY AUTOMATED COUNT: 11.8 % (ref 11.6–15.1)
GFR SERPL CREATININE-BSD FRML MDRD: 94 ML/MIN/1.73SQ M
GLUCOSE SERPL-MCNC: 116 MG/DL (ref 65–140)
HCT VFR BLD AUTO: 43.4 % (ref 34.8–46.1)
HGB BLD-MCNC: 15.2 G/DL (ref 11.5–15.4)
IMM GRANULOCYTES # BLD AUTO: 0.09 THOUSAND/UL (ref 0–0.2)
IMM GRANULOCYTES NFR BLD AUTO: 1 % (ref 0–2)
LYMPHOCYTES # BLD AUTO: 1.07 THOUSANDS/ΜL (ref 0.6–4.47)
LYMPHOCYTES NFR BLD AUTO: 15 % (ref 14–44)
MCH RBC QN AUTO: 32.1 PG (ref 26.8–34.3)
MCHC RBC AUTO-ENTMCNC: 35 G/DL (ref 31.4–37.4)
MCV RBC AUTO: 92 FL (ref 82–98)
MONOCYTES # BLD AUTO: 1.29 THOUSAND/ΜL (ref 0.17–1.22)
MONOCYTES NFR BLD AUTO: 18 % (ref 4–12)
NEUTROPHILS # BLD AUTO: 4.57 THOUSANDS/ΜL (ref 1.85–7.62)
NEUTS SEG NFR BLD AUTO: 64 % (ref 43–75)
NRBC BLD AUTO-RTO: 0 /100 WBCS
PLATELET # BLD AUTO: 294 THOUSANDS/UL (ref 149–390)
PMV BLD AUTO: 9.3 FL (ref 8.9–12.7)
POTASSIUM SERPL-SCNC: 4 MMOL/L (ref 3.5–5.3)
RBC # BLD AUTO: 4.74 MILLION/UL (ref 3.81–5.12)
SODIUM SERPL-SCNC: 133 MMOL/L (ref 136–145)
WBC # BLD AUTO: 7.13 THOUSAND/UL (ref 4.31–10.16)

## 2022-05-01 PROCEDURE — 85025 COMPLETE CBC W/AUTO DIFF WBC: CPT | Performed by: PHYSICIAN ASSISTANT

## 2022-05-01 PROCEDURE — 99232 SBSQ HOSP IP/OBS MODERATE 35: CPT | Performed by: SURGERY

## 2022-05-01 PROCEDURE — 80048 BASIC METABOLIC PNL TOTAL CA: CPT | Performed by: PHYSICIAN ASSISTANT

## 2022-05-01 RX ADMIN — HEPARIN SODIUM 5000 UNITS: 5000 INJECTION INTRAVENOUS; SUBCUTANEOUS at 22:49

## 2022-05-01 RX ADMIN — DEXTROSE, SODIUM CHLORIDE, AND POTASSIUM CHLORIDE 125 ML/HR: 5; .45; .15 INJECTION INTRAVENOUS at 15:22

## 2022-05-01 RX ADMIN — HEPARIN SODIUM 5000 UNITS: 5000 INJECTION INTRAVENOUS; SUBCUTANEOUS at 13:01

## 2022-05-01 RX ADMIN — DEXTROSE, SODIUM CHLORIDE, AND POTASSIUM CHLORIDE 125 ML/HR: 5; .45; .15 INJECTION INTRAVENOUS at 22:49

## 2022-05-01 RX ADMIN — DEXTROSE, SODIUM CHLORIDE, AND POTASSIUM CHLORIDE 125 ML/HR: 5; .45; .15 INJECTION INTRAVENOUS at 06:34

## 2022-05-01 RX ADMIN — MORPHINE SULFATE 2 MG: 2 INJECTION, SOLUTION INTRAMUSCULAR; INTRAVENOUS at 22:44

## 2022-05-01 RX ADMIN — HEPARIN SODIUM 5000 UNITS: 5000 INJECTION INTRAVENOUS; SUBCUTANEOUS at 05:24

## 2022-05-01 NOTE — PLAN OF CARE
Problem: GASTROINTESTINAL - ADULT  Goal: Minimal or absence of nausea and/or vomiting  Description: INTERVENTIONS:  - Administer IV fluids if ordered to ensure adequate hydration  - Administer ordered antiemetic medications as needed  - Provide nonpharmacologic comfort measures as appropriate  - Advance diet as tolerated, if ordered  - Consider nutrition services referral to assist patient with adequate nutrition and appropriate food choices  Outcome: Progressing  Goal: Maintains or returns to baseline bowel function  Description: INTERVENTIONS:  - Assess bowel function  - Encourage oral fluids to ensure adequate hydration  - Administer IV fluids if ordered to ensure adequate hydration  - Administer ordered medications as needed  - Encourage mobilization and activity  - Consider nutritional services referral to assist patient with adequate nutrition and appropriate food choices  Outcome: Progressing     Problem: Nutrition/Hydration-ADULT  Goal: Nutrient/Hydration intake appropriate for improving, restoring or maintaining nutritional needs  Description: Monitor and assess patient's nutrition/hydration status for malnutrition  Collaborate with interdisciplinary team and initiate plan and interventions as ordered  Monitor patient's weight and dietary intake as ordered or per policy  Utilize nutrition screening tool and intervene as necessary  Determine patient's food preferences and provide high-protein, high-caloric foods as appropriate       INTERVENTIONS:  - Monitor oral intake, urinary output, labs, and treatment plans  - Assess nutrition and hydration status and recommend course of action  - Evaluate amount of meals eaten  - Assist patient with eating if necessary   - Allow adequate time for meals  - Recommend/ encourage appropriate diets, oral nutritional supplements, and vitamin/mineral supplements  - Order, calculate, and assess calorie counts as needed  - Recommend, monitor, and adjust tube feedings and TPN/PPN based on assessed needs  - Assess need for intravenous fluids  - Provide specific nutrition/hydration education as appropriate  - Include patient/family/caregiver in decisions related to nutrition  Outcome: Progressing

## 2022-05-01 NOTE — PROGRESS NOTES
Progress Note - General Surgery   [facility] 59 [facility]  [facility] MRN: [id number]  Unit/Bed#: -01 Encounter: [id number]    Assessment/Plan  Alley Pagan is a 59 y o  female     Small bowel obstruction  AVSS, WBC 7 13  NGT 1 25L following insertion yesterday afternoon   Scant amount of flatus     Plan for SBFT tomorrow as patient had significant NGT output after reinsertion but is still exhibiting bowel function  May require surgical intervention pending results of SBFT   Continue current care, NPO/IVF with NGT decompression at this this time    Serial abdominal exams  Encourage ambulation/OOB  Trend labs, monitor vitals, replete electrolytes as needed   Pain control, antiemetics PRN  DVT prophylaxis    Subjective/Objective    Subjective: No acute events overnight    Objective:     Blood pressure 120/74, pulse 96, temperature 97 9 °F (36 6 °C), resp  rate 18, height 5' 8" (1 727 m), weight 83 kg (182 lb 15 7 oz), SpO2 90 %  ,Body mass index is 27 82 kg/m²        Intake/Output Summary (Last 24 hours) at 5/1/2022 0953  Last data filed at 5/1/2022 0501  Gross per 24 hour   Intake 120 ml   Output 1250 ml   Net -1130 ml       Invasive Devices  Report    Peripheral Intravenous Line            Peripheral IV 04/27/22 Right Antecubital 3 days          Drain            NG/OG/Enteral Tube Nasogastric 14 Fr Left nare <1 day                Physical Exam: /74   Pulse 96   Temp 97 9 °F (36 6 °C)   Resp 18   Ht 5' 8" (1 727 m)   Wt 83 kg (182 lb 15 7 oz)   SpO2 90%   BMI 27 82 kg/m²   General appearance: alert and oriented, in no acute distress  Lungs: clear to auscultation bilaterally  Heart: regular rate and rhythm, S1, S2 normal, no murmur, click, rub or gallop  Abdomen: soft, distended but improved following NGT insertion, diffuse abdominal soreness, no guarding or rebound, no peritoneal tenderness   Extremities: extremities normal, warm and well-perfused; no cyanosis, clubbing, or edema    Lab, Imaging and other studies:I have personally reviewed pertinent lab results       VTE Pharmacologic Prophylaxis: Heparin  VTE Mechanical Prophylaxis: sequential compression device    Recent Results (from the past 36 hour(s))   CBC and differential    Collection Time: 04/30/22  5:55 AM   Result Value Ref Range    WBC 6 75 4 31 - 10 16 Thousand/uL    RBC 4 70 3 81 - 5 12 Million/uL    Hemoglobin 15 0 11 5 - 15 4 g/dL    Hematocrit 42 9 34 8 - 46 1 %    MCV 91 82 - 98 fL    MCH 31 9 26 8 - 34 3 pg    MCHC 35 0 31 4 - 37 4 g/dL    RDW 11 8 11 6 - 15 1 %    MPV 9 8 8 9 - 12 7 fL    Platelets 702 340 - 205 Thousands/uL    nRBC 0 /100 WBCs   Basic metabolic panel    Collection Time: 04/30/22  5:55 AM   Result Value Ref Range    Sodium 131 (L) 136 - 145 mmol/L    Potassium 4 0 3 5 - 5 3 mmol/L    Chloride 95 (L) 100 - 108 mmol/L    CO2 28 21 - 32 mmol/L    ANION GAP 8 4 - 13 mmol/L    BUN 19 5 - 25 mg/dL    Creatinine 0 66 0 60 - 1 30 mg/dL    Glucose 131 65 - 140 mg/dL    Calcium 8 9 8 3 - 10 1 mg/dL    eGFR 94 ml/min/1 73sq m   CBC and differential    Collection Time: 05/01/22  5:15 AM   Result Value Ref Range    WBC 7 13 4 31 - 10 16 Thousand/uL    RBC 4 74 3 81 - 5 12 Million/uL    Hemoglobin 15 2 11 5 - 15 4 g/dL    Hematocrit 43 4 34 8 - 46 1 %    MCV 92 82 - 98 fL    MCH 32 1 26 8 - 34 3 pg    MCHC 35 0 31 4 - 37 4 g/dL    RDW 11 8 11 6 - 15 1 %    MPV 9 3 8 9 - 12 7 fL    Platelets 083 046 - 436 Thousands/uL    nRBC 0 /100 WBCs    Neutrophils Relative 64 43 - 75 %    Immat GRANS % 1 0 - 2 %    Lymphocytes Relative 15 14 - 44 %    Monocytes Relative 18 (H) 4 - 12 %    Eosinophils Relative 1 0 - 6 %    Basophils Relative 1 0 - 1 %    Neutrophils Absolute 4 57 1 85 - 7 62 Thousands/µL    Immature Grans Absolute 0 09 0 00 - 0 20 Thousand/uL    Lymphocytes Absolute 1 07 0 60 - 4 47 Thousands/µL    Monocytes Absolute 1 29 (H) 0 17 - 1 22 Thousand/µL    Eosinophils Absolute 0 07 0 00 - 0 61 Thousand/µL    Basophils Absolute 0 04 0 00 - 0 10 Thousands/µL   Basic metabolic panel    Collection Time: 05/01/22  5:15 AM   Result Value Ref Range    Sodium 133 (L) 136 - 145 mmol/L    Potassium 4 0 3 5 - 5 3 mmol/L    Chloride 96 (L) 100 - 108 mmol/L    CO2 30 21 - 32 mmol/L    ANION GAP 7 4 - 13 mmol/L    BUN 20 5 - 25 mg/dL    Creatinine 0 65 0 60 - 1 30 mg/dL    Glucose 116 65 - 140 mg/dL    Calcium 8 8 8 3 - 10 1 mg/dL    eGFR 94 ml/min/1 73sq m

## 2022-05-02 ENCOUNTER — APPOINTMENT (INPATIENT)
Dept: RADIOLOGY | Facility: HOSPITAL | Age: 62
DRG: 336 | End: 2022-05-02
Payer: COMMERCIAL

## 2022-05-02 LAB
ANION GAP SERPL CALCULATED.3IONS-SCNC: 8 MMOL/L (ref 4–13)
BASOPHILS # BLD MANUAL: 0 THOUSAND/UL (ref 0–0.1)
BASOPHILS NFR MAR MANUAL: 0 % (ref 0–1)
BUN SERPL-MCNC: 17 MG/DL (ref 5–25)
CALCIUM SERPL-MCNC: 8.4 MG/DL (ref 8.3–10.1)
CHLORIDE SERPL-SCNC: 97 MMOL/L (ref 100–108)
CO2 SERPL-SCNC: 26 MMOL/L (ref 21–32)
CREAT SERPL-MCNC: 0.57 MG/DL (ref 0.6–1.3)
EOSINOPHIL # BLD MANUAL: 0.08 THOUSAND/UL (ref 0–0.4)
EOSINOPHIL NFR BLD MANUAL: 1 % (ref 0–6)
ERYTHROCYTE [DISTWIDTH] IN BLOOD BY AUTOMATED COUNT: 11.8 % (ref 11.6–15.1)
GFR SERPL CREATININE-BSD FRML MDRD: 98 ML/MIN/1.73SQ M
GLUCOSE SERPL-MCNC: 134 MG/DL (ref 65–140)
HCT VFR BLD AUTO: 42.9 % (ref 34.8–46.1)
HGB BLD-MCNC: 14.5 G/DL (ref 11.5–15.4)
LYMPHOCYTES # BLD AUTO: 1.34 THOUSAND/UL (ref 0.6–4.47)
LYMPHOCYTES # BLD AUTO: 17 % (ref 14–44)
MCH RBC QN AUTO: 31.7 PG (ref 26.8–34.3)
MCHC RBC AUTO-ENTMCNC: 33.8 G/DL (ref 31.4–37.4)
MCV RBC AUTO: 94 FL (ref 82–98)
MONOCYTES # BLD AUTO: 1.26 THOUSAND/UL (ref 0–1.22)
MONOCYTES NFR BLD: 16 % (ref 4–12)
NEUTROPHILS # BLD MANUAL: 5.2 THOUSAND/UL (ref 1.85–7.62)
NEUTS BAND NFR BLD MANUAL: 1 % (ref 0–8)
NEUTS SEG NFR BLD AUTO: 65 % (ref 43–75)
PLATELET # BLD AUTO: 290 THOUSANDS/UL (ref 149–390)
PLATELET BLD QL SMEAR: ADEQUATE
PMV BLD AUTO: 9.4 FL (ref 8.9–12.7)
POTASSIUM SERPL-SCNC: 4.4 MMOL/L (ref 3.5–5.3)
RBC # BLD AUTO: 4.58 MILLION/UL (ref 3.81–5.12)
RBC MORPH BLD: NORMAL
SODIUM SERPL-SCNC: 131 MMOL/L (ref 136–145)
WBC # BLD AUTO: 7.88 THOUSAND/UL (ref 4.31–10.16)

## 2022-05-02 PROCEDURE — 85007 BL SMEAR W/DIFF WBC COUNT: CPT | Performed by: PHYSICIAN ASSISTANT

## 2022-05-02 PROCEDURE — 85027 COMPLETE CBC AUTOMATED: CPT | Performed by: PHYSICIAN ASSISTANT

## 2022-05-02 PROCEDURE — 74250 X-RAY XM SM INT 1CNTRST STD: CPT

## 2022-05-02 PROCEDURE — 80048 BASIC METABOLIC PNL TOTAL CA: CPT | Performed by: PHYSICIAN ASSISTANT

## 2022-05-02 PROCEDURE — 99232 SBSQ HOSP IP/OBS MODERATE 35: CPT | Performed by: STUDENT IN AN ORGANIZED HEALTH CARE EDUCATION/TRAINING PROGRAM

## 2022-05-02 RX ORDER — LIDOCAINE 50 MG/G
1 PATCH TOPICAL DAILY
Status: DISCONTINUED | OUTPATIENT
Start: 2022-05-02 | End: 2022-05-06 | Stop reason: HOSPADM

## 2022-05-02 RX ADMIN — DIATRIZOATE MEGLUMINE AND DIATRIZOATE SODIUM 120 ML: 660; 100 LIQUID ORAL; RECTAL at 16:59

## 2022-05-02 RX ADMIN — ONDANSETRON 4 MG: 2 INJECTION INTRAMUSCULAR; INTRAVENOUS at 11:39

## 2022-05-02 RX ADMIN — HEPARIN SODIUM 5000 UNITS: 5000 INJECTION INTRAVENOUS; SUBCUTANEOUS at 13:05

## 2022-05-02 RX ADMIN — LIDOCAINE 5% 1 PATCH: 700 PATCH TOPICAL at 15:23

## 2022-05-02 RX ADMIN — DEXTROSE, SODIUM CHLORIDE, AND POTASSIUM CHLORIDE 125 ML/HR: 5; .45; .15 INJECTION INTRAVENOUS at 15:50

## 2022-05-02 RX ADMIN — DEXTROSE, SODIUM CHLORIDE, AND POTASSIUM CHLORIDE 125 ML/HR: 5; .45; .15 INJECTION INTRAVENOUS at 05:49

## 2022-05-02 RX ADMIN — HEPARIN SODIUM 5000 UNITS: 5000 INJECTION INTRAVENOUS; SUBCUTANEOUS at 05:49

## 2022-05-02 RX ADMIN — ONDANSETRON 4 MG: 2 INJECTION INTRAMUSCULAR; INTRAVENOUS at 20:28

## 2022-05-02 NOTE — PLAN OF CARE
Problem: PAIN - ADULT  Goal: Verbalizes/displays adequate comfort level or baseline comfort level  Description: Interventions:  - Encourage patient to monitor pain and request assistance  - Assess pain using appropriate pain scale  - Administer analgesics based on type and severity of pain and evaluate response  - Implement non-pharmacological measures as appropriate and evaluate response  - Consider cultural and social influences on pain and pain management  - Notify physician/advanced practitioner if interventions unsuccessful or patient reports new pain  Outcome: Progressing     Problem: GASTROINTESTINAL - ADULT  Goal: Minimal or absence of nausea and/or vomiting  Description: INTERVENTIONS:  - Administer IV fluids if ordered to ensure adequate hydration  - Administer ordered antiemetic medications as needed  - Provide nonpharmacologic comfort measures as appropriate  - Advance diet as tolerated, if ordered  - Consider nutrition services referral to assist patient with adequate nutrition and appropriate food choices  Outcome: Progressing  Goal: Maintains or returns to baseline bowel function  Description: INTERVENTIONS:  - Assess bowel function  - Encourage oral fluids to ensure adequate hydration  - Administer IV fluids if ordered to ensure adequate hydration  - Administer ordered medications as needed  - Encourage mobilization and activity  - Consider nutritional services referral to assist patient with adequate nutrition and appropriate food choices  Outcome: Progressing

## 2022-05-02 NOTE — PROGRESS NOTES
Progress Note - General Surgery   Rosario Mi 59 y o  female MRN: [id number]  Unit/Bed#: -01 Encounter: [id number]      Assessment:   Magi Cummings is a 59 y o  female      Small bowel obstruction  - AVSS, WBC 7 88  - NGT OP recorded as 1100cc (improved from yesterday)  - Abdomen soft but with mild distention, sore to palpation, hypoactive bs  - Patient unable to tolerate NGT clamping to complete SBFT study     Plan:  - Continue NGT to LIWS  - Continue to monitor NGT OP and bowel function  - Serial abdominal exams  - Encourage ambulation/OOB  - Trend labs, monitor vitals, replete electrolytes as needed   - Pain control, antiemetics PRN  - DVT prophylaxis  - Will discuss next steps with surgical attending  If patient fails conservative management, surgical intervention may be required      Subjective/Objective   Chief Complaint: none    Subjective: no acute events  Patient had NGT clamped for SBFT study, and denied increase in N/V  Denies fevers, chills,     Objective:     Blood pressure 110/61, pulse 86, temperature 98 3 °F (36 8 °C), resp  rate 19, height 5' 8" (1 727 m), weight 83 kg (182 lb 15 7 oz), SpO2 93 %  Body mass index is 27 82 kg/m²  I/O       04/30 0701  05/01 0700 05/01 0701  05/02 0700 05/02 0701  05/03 0700    P  O  120  0    I V  (mL/kg) 1627 1 (19 6)      NG/GT 0      Total Intake(mL/kg) 1747 1 (21)  0 (0)    Urine (mL/kg/hr) 0 (0) 200 (0 1)     Emesis/NG output 1250 1100 900    Stool       Total Output 1250 1300 900    Net +497 1 -1300 -900           Unmeasured Urine Occurrence 1 x 1 x           Invasive Devices  Report    Peripheral Intravenous Line            Peripheral IV 05/01/22 Left Antecubital 1 day          Drain            NG/OG/Enteral Tube Nasogastric 14 Fr Left nare 1 day                Physical Exam: /83   Pulse 87   Temp 98 3 °F (36 8 °C)   Resp 18   Ht 5' 8" (1 727 m)   Wt 83 kg (182 lb 15 7 oz)   SpO2 92%   BMI 27 82 kg/m²   General appearance: alert and oriented, in no acute distress  Lungs: clear to auscultation bilaterally  Heart: regular rate and rhythm and S1, S2 normal  Abdomen: soft, mildly distended, no shiva TTP just soreness, hypoactive BS  Extremities: extremities normal, warm and well-perfused; no cyanosis, clubbing, or edema    Labs   Recent Results (from the past 24 hour(s))   CBC and differential    Collection Time: 05/02/22  5:32 AM   Result Value Ref Range    WBC 7 88 4 31 - 10 16 Thousand/uL    RBC 4 58 3 81 - 5 12 Million/uL    Hemoglobin 14 5 11 5 - 15 4 g/dL    Hematocrit 42 9 34 8 - 46 1 %    MCV 94 82 - 98 fL    MCH 31 7 26 8 - 34 3 pg    MCHC 33 8 31 4 - 37 4 g/dL    RDW 11 8 11 6 - 15 1 %    MPV 9 4 8 9 - 12 7 fL    Platelets 728 605 - 418 Thousands/uL   Basic metabolic panel    Collection Time: 05/02/22  5:32 AM   Result Value Ref Range    Sodium 131 (L) 136 - 145 mmol/L    Potassium 4 4 3 5 - 5 3 mmol/L    Chloride 97 (L) 100 - 108 mmol/L    CO2 26 21 - 32 mmol/L    ANION GAP 8 4 - 13 mmol/L    BUN 17 5 - 25 mg/dL    Creatinine 0 57 (L) 0 60 - 1 30 mg/dL    Glucose 134 65 - 140 mg/dL    Calcium 8 4 8 3 - 10 1 mg/dL    eGFR 98 ml/min/1 73sq m   Manual Differential(PHLEBS Do Not Order)    Collection Time: 05/02/22  5:32 AM   Result Value Ref Range    Segmented % 65 43 - 75 %    Bands % 1 0 - 8 %    Lymphocytes % 17 14 - 44 %    Monocytes % 16 (H) 4 - 12 %    Eosinophils, % 1 0 - 6 %    Basophils % 0 0 - 1 %    Absolute Neutrophils 5 20 1 85 - 7 62 Thousand/uL    Lymphocytes Absolute 1 34 0 60 - 4 47 Thousand/uL    Monocytes Absolute 1 26 (H) 0 00 - 1 22 Thousand/uL    Eosinophils Absolute 0 08 0 00 - 0 40 Thousand/uL    Basophils Absolute 0 00 0 00 - 0 10 Thousand/uL    Total Counted      RBC Morphology Normal     Platelet Estimate Adequate Adequate        Imaging and other studies:  CT abdomen pelvis with contrast    Result Date: 4/27/2022  Impression: Small bowel obstruction with the transition point in the right side of the abdomen where there is a loop of thickened small bowel with surrounding mild inflammation  This could reflect focal enteritis although underlying no bowel lesion cannot be excluded  Clinical correlation and follow-up advised   Workstation performed: [id number]       VTE Pharmacologic Prophylaxis: Heparin  VTE Mechanical Prophylaxis: sequential compression device    Anali Garcia PA-C  5/2/2022

## 2022-05-03 ENCOUNTER — ANESTHESIA (INPATIENT)
Dept: PERIOP | Facility: HOSPITAL | Age: 62
DRG: 336 | End: 2022-05-03
Payer: COMMERCIAL

## 2022-05-03 ENCOUNTER — ANESTHESIA EVENT (INPATIENT)
Dept: PERIOP | Facility: HOSPITAL | Age: 62
DRG: 336 | End: 2022-05-03
Payer: COMMERCIAL

## 2022-05-03 PROBLEM — E78.5 HYPERLIPIDEMIA: Status: ACTIVE | Noted: 2019-10-08

## 2022-05-03 LAB
ABO GROUP BLD: NORMAL
ABO GROUP BLD: NORMAL
ANION GAP SERPL CALCULATED.3IONS-SCNC: 8 MMOL/L (ref 4–13)
BLD GP AB SCN SERPL QL: NEGATIVE
BUN SERPL-MCNC: 13 MG/DL (ref 5–25)
CALCIUM SERPL-MCNC: 8.6 MG/DL (ref 8.3–10.1)
CHLORIDE SERPL-SCNC: 97 MMOL/L (ref 100–108)
CO2 SERPL-SCNC: 26 MMOL/L (ref 21–32)
CREAT SERPL-MCNC: 0.63 MG/DL (ref 0.6–1.3)
ERYTHROCYTE [DISTWIDTH] IN BLOOD BY AUTOMATED COUNT: 11.8 % (ref 11.6–15.1)
EST. AVERAGE GLUCOSE BLD GHB EST-MCNC: 105 MG/DL
GFR SERPL CREATININE-BSD FRML MDRD: 95 ML/MIN/1.73SQ M
GLUCOSE SERPL-MCNC: 109 MG/DL (ref 65–140)
GLUCOSE SERPL-MCNC: 134 MG/DL (ref 65–140)
GLUCOSE SERPL-MCNC: 136 MG/DL (ref 65–140)
HBA1C MFR BLD: 5.3 %
HCT VFR BLD AUTO: 44.1 % (ref 34.8–46.1)
HGB BLD-MCNC: 15 G/DL (ref 11.5–15.4)
MCH RBC QN AUTO: 31.5 PG (ref 26.8–34.3)
MCHC RBC AUTO-ENTMCNC: 34 G/DL (ref 31.4–37.4)
MCV RBC AUTO: 93 FL (ref 82–98)
PLATELET # BLD AUTO: 314 THOUSANDS/UL (ref 149–390)
PMV BLD AUTO: 9.3 FL (ref 8.9–12.7)
POTASSIUM SERPL-SCNC: 4.4 MMOL/L (ref 3.5–5.3)
PREALB SERPL-MCNC: 18.8 MG/DL (ref 18–40)
RBC # BLD AUTO: 4.76 MILLION/UL (ref 3.81–5.12)
RH BLD: POSITIVE
RH BLD: POSITIVE
SODIUM SERPL-SCNC: 131 MMOL/L (ref 136–145)
SPECIMEN EXPIRATION DATE: NORMAL
WBC # BLD AUTO: 9.12 THOUSAND/UL (ref 4.31–10.16)

## 2022-05-03 PROCEDURE — 85027 COMPLETE CBC AUTOMATED: CPT | Performed by: CLINICAL NURSE SPECIALIST

## 2022-05-03 PROCEDURE — 86850 RBC ANTIBODY SCREEN: CPT | Performed by: PHYSICIAN ASSISTANT

## 2022-05-03 PROCEDURE — 86901 BLOOD TYPING SEROLOGIC RH(D): CPT | Performed by: PHYSICIAN ASSISTANT

## 2022-05-03 PROCEDURE — 84134 ASSAY OF PREALBUMIN: CPT | Performed by: PHYSICIAN ASSISTANT

## 2022-05-03 PROCEDURE — 80048 BASIC METABOLIC PNL TOTAL CA: CPT | Performed by: CLINICAL NURSE SPECIALIST

## 2022-05-03 PROCEDURE — 82948 REAGENT STRIP/BLOOD GLUCOSE: CPT

## 2022-05-03 PROCEDURE — 44180 LAP ENTEROLYSIS: CPT

## 2022-05-03 PROCEDURE — 83036 HEMOGLOBIN GLYCOSYLATED A1C: CPT | Performed by: PHYSICIAN ASSISTANT

## 2022-05-03 PROCEDURE — C1765 ADHESION BARRIER: HCPCS | Performed by: STUDENT IN AN ORGANIZED HEALTH CARE EDUCATION/TRAINING PROGRAM

## 2022-05-03 PROCEDURE — 0DN84ZZ RELEASE SMALL INTESTINE, PERCUTANEOUS ENDOSCOPIC APPROACH: ICD-10-PCS | Performed by: STUDENT IN AN ORGANIZED HEALTH CARE EDUCATION/TRAINING PROGRAM

## 2022-05-03 PROCEDURE — 86900 BLOOD TYPING SEROLOGIC ABO: CPT | Performed by: PHYSICIAN ASSISTANT

## 2022-05-03 PROCEDURE — NC001 PR NO CHARGE

## 2022-05-03 PROCEDURE — 44180 LAP ENTEROLYSIS: CPT | Performed by: STUDENT IN AN ORGANIZED HEALTH CARE EDUCATION/TRAINING PROGRAM

## 2022-05-03 RX ORDER — MIDAZOLAM HYDROCHLORIDE 2 MG/2ML
INJECTION, SOLUTION INTRAMUSCULAR; INTRAVENOUS AS NEEDED
Status: DISCONTINUED | OUTPATIENT
Start: 2022-05-03 | End: 2022-05-03

## 2022-05-03 RX ORDER — BUPIVACAINE HYDROCHLORIDE 2.5 MG/ML
INJECTION, SOLUTION EPIDURAL; INFILTRATION; INTRACAUDAL AS NEEDED
Status: DISCONTINUED | OUTPATIENT
Start: 2022-05-03 | End: 2022-05-03 | Stop reason: HOSPADM

## 2022-05-03 RX ORDER — PROMETHAZINE HYDROCHLORIDE 25 MG/ML
12.5 INJECTION, SOLUTION INTRAMUSCULAR; INTRAVENOUS ONCE AS NEEDED
Status: DISCONTINUED | OUTPATIENT
Start: 2022-05-03 | End: 2022-05-03 | Stop reason: HOSPADM

## 2022-05-03 RX ORDER — PROPOFOL 10 MG/ML
INJECTION, EMULSION INTRAVENOUS AS NEEDED
Status: DISCONTINUED | OUTPATIENT
Start: 2022-05-03 | End: 2022-05-03

## 2022-05-03 RX ORDER — FENTANYL CITRATE 50 UG/ML
INJECTION, SOLUTION INTRAMUSCULAR; INTRAVENOUS AS NEEDED
Status: DISCONTINUED | OUTPATIENT
Start: 2022-05-03 | End: 2022-05-03

## 2022-05-03 RX ORDER — DEXAMETHASONE SODIUM PHOSPHATE 10 MG/ML
INJECTION, SOLUTION INTRAMUSCULAR; INTRAVENOUS AS NEEDED
Status: DISCONTINUED | OUTPATIENT
Start: 2022-05-03 | End: 2022-05-03

## 2022-05-03 RX ORDER — SODIUM CHLORIDE, SODIUM LACTATE, POTASSIUM CHLORIDE, CALCIUM CHLORIDE 600; 310; 30; 20 MG/100ML; MG/100ML; MG/100ML; MG/100ML
INJECTION, SOLUTION INTRAVENOUS CONTINUOUS PRN
Status: DISCONTINUED | OUTPATIENT
Start: 2022-05-03 | End: 2022-05-03

## 2022-05-03 RX ORDER — MAGNESIUM HYDROXIDE 1200 MG/15ML
LIQUID ORAL AS NEEDED
Status: DISCONTINUED | OUTPATIENT
Start: 2022-05-03 | End: 2022-05-03 | Stop reason: HOSPADM

## 2022-05-03 RX ORDER — HYDROMORPHONE HCL/PF 1 MG/ML
0.5 SYRINGE (ML) INJECTION
Status: DISCONTINUED | OUTPATIENT
Start: 2022-05-03 | End: 2022-05-03 | Stop reason: HOSPADM

## 2022-05-03 RX ORDER — SODIUM CHLORIDE, SODIUM LACTATE, POTASSIUM CHLORIDE, CALCIUM CHLORIDE 600; 310; 30; 20 MG/100ML; MG/100ML; MG/100ML; MG/100ML
125 INJECTION, SOLUTION INTRAVENOUS CONTINUOUS
Status: DISCONTINUED | OUTPATIENT
Start: 2022-05-03 | End: 2022-05-06

## 2022-05-03 RX ORDER — LIDOCAINE HYDROCHLORIDE 10 MG/ML
INJECTION, SOLUTION EPIDURAL; INFILTRATION; INTRACAUDAL; PERINEURAL AS NEEDED
Status: DISCONTINUED | OUTPATIENT
Start: 2022-05-03 | End: 2022-05-03

## 2022-05-03 RX ORDER — ONDANSETRON 2 MG/ML
INJECTION INTRAMUSCULAR; INTRAVENOUS AS NEEDED
Status: DISCONTINUED | OUTPATIENT
Start: 2022-05-03 | End: 2022-05-03

## 2022-05-03 RX ORDER — CEFAZOLIN SODIUM 2 G/50ML
2000 SOLUTION INTRAVENOUS
Status: COMPLETED | OUTPATIENT
Start: 2022-05-03 | End: 2022-05-03

## 2022-05-03 RX ORDER — HYDROMORPHONE HCL/PF 1 MG/ML
SYRINGE (ML) INJECTION AS NEEDED
Status: DISCONTINUED | OUTPATIENT
Start: 2022-05-03 | End: 2022-05-03

## 2022-05-03 RX ORDER — FENTANYL CITRATE/PF 50 MCG/ML
25 SYRINGE (ML) INJECTION
Status: DISCONTINUED | OUTPATIENT
Start: 2022-05-03 | End: 2022-05-03 | Stop reason: HOSPADM

## 2022-05-03 RX ORDER — ROCURONIUM BROMIDE 10 MG/ML
INJECTION, SOLUTION INTRAVENOUS AS NEEDED
Status: DISCONTINUED | OUTPATIENT
Start: 2022-05-03 | End: 2022-05-03

## 2022-05-03 RX ORDER — DIPHENHYDRAMINE HYDROCHLORIDE 50 MG/ML
12.5 INJECTION INTRAMUSCULAR; INTRAVENOUS ONCE AS NEEDED
Status: DISCONTINUED | OUTPATIENT
Start: 2022-05-03 | End: 2022-05-03 | Stop reason: HOSPADM

## 2022-05-03 RX ORDER — SODIUM CHLORIDE, SODIUM LACTATE, POTASSIUM CHLORIDE, CALCIUM CHLORIDE 600; 310; 30; 20 MG/100ML; MG/100ML; MG/100ML; MG/100ML
50 INJECTION, SOLUTION INTRAVENOUS CONTINUOUS
Status: DISCONTINUED | OUTPATIENT
Start: 2022-05-03 | End: 2022-05-03

## 2022-05-03 RX ORDER — SODIUM CHLORIDE 9 MG/ML
INJECTION, SOLUTION INTRAVENOUS CONTINUOUS PRN
Status: DISCONTINUED | OUTPATIENT
Start: 2022-05-03 | End: 2022-05-03

## 2022-05-03 RX ADMIN — MIDAZOLAM HYDROCHLORIDE 2 MG: 1 INJECTION, SOLUTION INTRAMUSCULAR; INTRAVENOUS at 11:35

## 2022-05-03 RX ADMIN — SODIUM CHLORIDE: 0.9 INJECTION, SOLUTION INTRAVENOUS at 11:36

## 2022-05-03 RX ADMIN — HEPARIN SODIUM 5000 UNITS: 5000 INJECTION INTRAVENOUS; SUBCUTANEOUS at 06:22

## 2022-05-03 RX ADMIN — SODIUM CHLORIDE, SODIUM LACTATE, POTASSIUM CHLORIDE, AND CALCIUM CHLORIDE 125 ML/HR: .6; .31; .03; .02 INJECTION, SOLUTION INTRAVENOUS at 15:56

## 2022-05-03 RX ADMIN — ONDANSETRON 4 MG: 2 INJECTION INTRAMUSCULAR; INTRAVENOUS at 12:48

## 2022-05-03 RX ADMIN — DEXAMETHASONE SODIUM PHOSPHATE 10 MG: 10 INJECTION, SOLUTION INTRAMUSCULAR; INTRAVENOUS at 11:52

## 2022-05-03 RX ADMIN — HEPARIN SODIUM 5000 UNITS: 5000 INJECTION INTRAVENOUS; SUBCUTANEOUS at 00:39

## 2022-05-03 RX ADMIN — FENTANYL CITRATE 100 MCG: 50 INJECTION, SOLUTION INTRAMUSCULAR; INTRAVENOUS at 11:37

## 2022-05-03 RX ADMIN — SUGAMMADEX 166 MG: 100 INJECTION, SOLUTION INTRAVENOUS at 13:03

## 2022-05-03 RX ADMIN — CEFAZOLIN SODIUM 2000 MG: 2 SOLUTION INTRAVENOUS at 11:13

## 2022-05-03 RX ADMIN — DEXTROSE, SODIUM CHLORIDE, AND POTASSIUM CHLORIDE 125 ML/HR: 5; .45; .15 INJECTION INTRAVENOUS at 08:50

## 2022-05-03 RX ADMIN — ROCURONIUM BROMIDE 50 MG: 10 INJECTION, SOLUTION INTRAVENOUS at 11:38

## 2022-05-03 RX ADMIN — LIDOCAINE HYDROCHLORIDE 50 MG: 10 INJECTION, SOLUTION EPIDURAL; INFILTRATION; INTRACAUDAL; PERINEURAL at 11:37

## 2022-05-03 RX ADMIN — SODIUM CHLORIDE, SODIUM LACTATE, POTASSIUM CHLORIDE, AND CALCIUM CHLORIDE 50 ML/HR: .6; .31; .03; .02 INJECTION, SOLUTION INTRAVENOUS at 10:51

## 2022-05-03 RX ADMIN — PROPOFOL 150 MG: 10 INJECTION, EMULSION INTRAVENOUS at 11:38

## 2022-05-03 RX ADMIN — HYDROMORPHONE HYDROCHLORIDE 0.5 MG: 1 INJECTION, SOLUTION INTRAMUSCULAR; INTRAVENOUS; SUBCUTANEOUS at 13:07

## 2022-05-03 RX ADMIN — HEPARIN SODIUM 5000 UNITS: 5000 INJECTION INTRAVENOUS; SUBCUTANEOUS at 22:59

## 2022-05-03 RX ADMIN — SODIUM CHLORIDE, SODIUM LACTATE, POTASSIUM CHLORIDE, AND CALCIUM CHLORIDE: .6; .31; .03; .02 INJECTION, SOLUTION INTRAVENOUS at 11:36

## 2022-05-03 RX ADMIN — DEXTROSE, SODIUM CHLORIDE, AND POTASSIUM CHLORIDE 125 ML/HR: 5; .45; .15 INJECTION INTRAVENOUS at 00:39

## 2022-05-03 RX ADMIN — LIDOCAINE 5% 1 PATCH: 700 PATCH TOPICAL at 16:13

## 2022-05-03 NOTE — ANESTHESIA PREPROCEDURE EVALUATION
Procedure:  LAPAROSCOPY DIAGNOSTIC, Possible Exploratory Laparotomy (N/A Abdomen)    Relevant Problems   CARDIO   (+) Hyperlipidemia      GI/HEPATIC   (+) Small bowel obstruction ([address] )   Presented 4/27 with SBO, failed conservative management  NGT in situ  Multiple prior abdominal surgeries, including robotic ventral hernia repair with mesh in May of 2021, total abdominal hysterectomy about 15 years ago, removal of a right ovarian tumor, appendectomy    Physical Exam    Airway  Comment: NGT in situ  Mallampati score: II  TM Distance: <3 FB  Neck ROM: full     Dental   No notable dental hx     Cardiovascular      Pulmonary      Other Findings        [place] I with Mac 3 on 5/11/21    Anesthesia Plan  ASA Score- 2     Anesthesia Type- general with ASA Monitors  Additional Monitors:   Airway Plan: ETT  Comment: After discussion of risks and benefits, patient consented to TAP block placement if conversion from laparoscopic to open procedure proves necessary  NGT to suction annabel-intubation  Plan Factors-Exercise tolerance (METS): >4 METS  Exercise comment: Able to climb two flights of stairs without cardiopulmonary limitation  Chart reviewed  EKG reviewed  Existing labs reviewed  Patient summary reviewed  Patient is not a current smoker  Patient did not smoke on day of surgery  Induction- intravenous  Postoperative Plan- Plan for postoperative opioid use  Planned trial extubation    Informed Consent- Anesthetic plan and risks discussed with patient

## 2022-05-03 NOTE — NURSING NOTE
Patient received from PACU  VSS  Denies complaints  Abdomen soft and rounded  NG tube patent  Placement confirmed  Midline abdomen and trochar sites clean, dry and intact  IVF started per order  No distress noted

## 2022-05-03 NOTE — ANESTHESIA POSTPROCEDURE EVALUATION
Post-Op Assessment Note    CV Status:  Stable  Pain Score: 1    Pain management: adequate     Mental Status:  Alert and awake   Hydration Status:  Euvolemic   PONV Controlled:  Controlled   Airway Patency:  Patent      Post Op Vitals Reviewed: Yes      Staff: CRNA         No complications documented      BP   122/72   Temp 97 5   Pulse 83   Resp 16   SpO2 97

## 2022-05-03 NOTE — PLAN OF CARE
Problem: GASTROINTESTINAL - ADULT  Goal: Minimal or absence of nausea and/or vomiting  Description: INTERVENTIONS:  - Administer IV fluids if ordered to ensure adequate hydration  - Administer ordered antiemetic medications as needed  - Provide nonpharmacologic comfort measures as appropriate  - Advance diet as tolerated, if ordered  - Consider nutrition services referral to assist patient with adequate nutrition and appropriate food choices  Outcome: Progressing

## 2022-05-03 NOTE — PLAN OF CARE
Problem: PAIN - ADULT  Goal: Verbalizes/displays adequate comfort level or baseline comfort level  Description: Interventions:  - Encourage patient to monitor pain and request assistance  - Assess pain using appropriate pain scale  - Administer analgesics based on type and severity of pain and evaluate response  - Implement non-pharmacological measures as appropriate and evaluate response  - Consider cultural and social influences on pain and pain management  - Notify physician/advanced practitioner if interventions unsuccessful or patient reports new pain  Outcome: Progressing     Problem: INFECTION - ADULT  Goal: Absence or prevention of progression during hospitalization  Description: INTERVENTIONS:  - Assess and monitor for signs and symptoms of infection  - Monitor lab/diagnostic results  - Monitor all insertion sites, i e  indwelling lines, tubes, and drains  - [place] appropriate cooling/warming therapies per order  - Administer medications as ordered  - Instruct and encourage patient and family to use good hand hygiene technique  - Identify and instruct in appropriate isolation precautions for identified infection/condition  Outcome: Progressing  Goal: Absence of fever/infection during neutropenic period  Description: INTERVENTIONS:  - Monitor WBC    Outcome: Progressing     Problem: SAFETY ADULT  Goal: Patient will remain free of falls  Description: INTERVENTIONS:  - Educate patient/family on patient safety including physical limitations  - Instruct patient to call for assistance with activity   - Consult OT/PT to assist with strengthening/mobility   - Keep Call bell within reach  - Keep bed low and locked with side rails adjusted as appropriate  - Keep care items and personal belongings within reach  - Initiate and maintain comfort rounds  - Make Fall Risk Sign visible to staff  - Apply yellow socks and bracelet for high fall risk patients  - Consider moving patient to room near nurses station  Outcome: Progressing     Problem: DISCHARGE PLANNING  Goal: Discharge to home or other facility with appropriate resources  Description: INTERVENTIONS:  - Identify barriers to discharge w/patient and caregiver  - Arrange for needed discharge resources and transportation as appropriate  - Identify discharge learning needs (meds, wound care, etc )  - Arrange for interpretive services to assist at discharge as needed  - Refer to Case Management Department for coordinating discharge planning if the patient needs post-hospital services based on physician/advanced practitioner order or complex needs related to functional status, cognitive ability, or social support system  Outcome: Progressing     Problem: GASTROINTESTINAL - ADULT  Goal: Minimal or absence of nausea and/or vomiting  Description: INTERVENTIONS:  - Administer IV fluids if ordered to ensure adequate hydration  - Administer ordered antiemetic medications as needed  - Provide nonpharmacologic comfort measures as appropriate  - Advance diet as tolerated, if ordered  - Consider nutrition services referral to assist patient with adequate nutrition and appropriate food choices  Outcome: Progressing  Goal: Maintains or returns to baseline bowel function  Description: INTERVENTIONS:  - Assess bowel function  - Encourage oral fluids to ensure adequate hydration  - Administer IV fluids if ordered to ensure adequate hydration  - Administer ordered medications as needed  - Encourage mobilization and activity  - Consider nutritional services referral to assist patient with adequate nutrition and appropriate food choices  Outcome: Progressing     Problem: Prexisting or High Potential for Compromised Skin Integrity  Goal: Skin integrity is maintained or improved  Description: INTERVENTIONS:  - Identify patients at risk for skin breakdown  - Assess and monitor skin integrity  - Assess and monitor nutrition and hydration status  - Monitor labs   - Assess for incontinence   - Turn and reposition patient  - Assist with mobility/ambulation  - Relieve pressure over bony prominences  - Avoid friction and shearing  - Provide appropriate hygiene as needed including keeping skin clean and dry  - Evaluate need for skin moisturizer/barrier cream  - Collaborate with interdisciplinary team   - Patient/family teaching  - Consider wound care consult   Outcome: Progressing     Problem: Nutrition/Hydration-ADULT  Goal: Nutrient/Hydration intake appropriate for improving, restoring or maintaining nutritional needs  Description: Monitor and assess patient's nutrition/hydration status for malnutrition  Collaborate with interdisciplinary team and initiate plan and interventions as ordered  Monitor patient's weight and dietary intake as ordered or per policy  Utilize nutrition screening tool and intervene as necessary  Determine patient's food preferences and provide high-protein, high-caloric foods as appropriate  INTERVENTIONS:  - Monitor oral intake, urinary output, labs, and treatment plans  - Assess nutrition and hydration status and recommend course of action  - Evaluate amount of meals eaten  - Assist patient with eating if necessary   - Allow adequate time for meals  - Recommend/ encourage appropriate diets, oral nutritional supplements, and vitamin/mineral supplements  - Order, calculate, and assess calorie counts as needed  - Recommend, monitor, and adjust tube feedings and TPN/PPN based on assessed needs  - Assess need for intravenous fluids  - Provide specific nutrition/hydration education as appropriate  - Include patient/family/caregiver in decisions related to nutrition  Outcome: Progressing   Patient received  VSS  NG tube patent for green/brown bile  Denies pain  Preparing for OR  CHG done previously  Able to make needs known

## 2022-05-04 LAB
ANION GAP SERPL CALCULATED.3IONS-SCNC: 9 MMOL/L (ref 4–13)
BASOPHILS # BLD MANUAL: 0 THOUSAND/UL (ref 0–0.1)
BASOPHILS NFR MAR MANUAL: 0 % (ref 0–1)
BUN SERPL-MCNC: 18 MG/DL (ref 5–25)
CALCIUM SERPL-MCNC: 8.9 MG/DL (ref 8.3–10.1)
CHLORIDE SERPL-SCNC: 98 MMOL/L (ref 100–108)
CO2 SERPL-SCNC: 27 MMOL/L (ref 21–32)
CREAT SERPL-MCNC: 0.6 MG/DL (ref 0.6–1.3)
EOSINOPHIL # BLD MANUAL: 0 THOUSAND/UL (ref 0–0.4)
EOSINOPHIL NFR BLD MANUAL: 0 % (ref 0–6)
ERYTHROCYTE [DISTWIDTH] IN BLOOD BY AUTOMATED COUNT: 11.9 % (ref 11.6–15.1)
GFR SERPL CREATININE-BSD FRML MDRD: 97 ML/MIN/1.73SQ M
GLUCOSE SERPL-MCNC: 113 MG/DL (ref 65–140)
HCT VFR BLD AUTO: 41.6 % (ref 34.8–46.1)
HGB BLD-MCNC: 14.2 G/DL (ref 11.5–15.4)
LYMPHOCYTES # BLD AUTO: 1.72 THOUSAND/UL (ref 0.6–4.47)
LYMPHOCYTES # BLD AUTO: 14 % (ref 14–44)
MAGNESIUM SERPL-MCNC: 2.1 MG/DL (ref 1.6–2.6)
MCH RBC QN AUTO: 32 PG (ref 26.8–34.3)
MCHC RBC AUTO-ENTMCNC: 34.1 G/DL (ref 31.4–37.4)
MCV RBC AUTO: 94 FL (ref 82–98)
MONOCYTES # BLD AUTO: 1.23 THOUSAND/UL (ref 0–1.22)
MONOCYTES NFR BLD: 10 % (ref 4–12)
MYELOCYTES NFR BLD MANUAL: 1 % (ref 0–1)
NEUTROPHILS # BLD MANUAL: 9.24 THOUSAND/UL (ref 1.85–7.62)
NEUTS BAND NFR BLD MANUAL: 8 % (ref 0–8)
NEUTS SEG NFR BLD AUTO: 67 % (ref 43–75)
PHOSPHATE SERPL-MCNC: 4.3 MG/DL (ref 2.3–4.1)
PLATELET # BLD AUTO: 309 THOUSANDS/UL (ref 149–390)
PLATELET BLD QL SMEAR: ADEQUATE
PMV BLD AUTO: 9.1 FL (ref 8.9–12.7)
POTASSIUM SERPL-SCNC: 4.5 MMOL/L (ref 3.5–5.3)
RBC # BLD AUTO: 4.44 MILLION/UL (ref 3.81–5.12)
RBC MORPH BLD: NORMAL
SODIUM SERPL-SCNC: 134 MMOL/L (ref 136–145)
WBC # BLD AUTO: 12.32 THOUSAND/UL (ref 4.31–10.16)

## 2022-05-04 PROCEDURE — 85007 BL SMEAR W/DIFF WBC COUNT: CPT | Performed by: PHYSICIAN ASSISTANT

## 2022-05-04 PROCEDURE — 80048 BASIC METABOLIC PNL TOTAL CA: CPT | Performed by: PHYSICIAN ASSISTANT

## 2022-05-04 PROCEDURE — 99024 POSTOP FOLLOW-UP VISIT: CPT | Performed by: STUDENT IN AN ORGANIZED HEALTH CARE EDUCATION/TRAINING PROGRAM

## 2022-05-04 PROCEDURE — 83735 ASSAY OF MAGNESIUM: CPT | Performed by: PHYSICIAN ASSISTANT

## 2022-05-04 PROCEDURE — 85027 COMPLETE CBC AUTOMATED: CPT | Performed by: PHYSICIAN ASSISTANT

## 2022-05-04 PROCEDURE — 84100 ASSAY OF PHOSPHORUS: CPT | Performed by: PHYSICIAN ASSISTANT

## 2022-05-04 RX ORDER — XYLITOL/YERBA SANTA
5 AEROSOL, SPRAY WITH PUMP (ML) MUCOUS MEMBRANE 4 TIMES DAILY PRN
Status: DISCONTINUED | OUTPATIENT
Start: 2022-05-04 | End: 2022-05-06 | Stop reason: HOSPADM

## 2022-05-04 RX ADMIN — SODIUM CHLORIDE, SODIUM LACTATE, POTASSIUM CHLORIDE, AND CALCIUM CHLORIDE 125 ML/HR: .6; .31; .03; .02 INJECTION, SOLUTION INTRAVENOUS at 19:47

## 2022-05-04 RX ADMIN — HEPARIN SODIUM 5000 UNITS: 5000 INJECTION INTRAVENOUS; SUBCUTANEOUS at 05:48

## 2022-05-04 RX ADMIN — HEPARIN SODIUM 5000 UNITS: 5000 INJECTION INTRAVENOUS; SUBCUTANEOUS at 21:24

## 2022-05-04 RX ADMIN — HEPARIN SODIUM 5000 UNITS: 5000 INJECTION INTRAVENOUS; SUBCUTANEOUS at 15:32

## 2022-05-04 RX ADMIN — LIDOCAINE 5% 1 PATCH: 700 PATCH TOPICAL at 07:57

## 2022-05-04 RX ADMIN — SODIUM CHLORIDE, SODIUM LACTATE, POTASSIUM CHLORIDE, AND CALCIUM CHLORIDE 125 ML/HR: .6; .31; .03; .02 INJECTION, SOLUTION INTRAVENOUS at 11:17

## 2022-05-04 RX ADMIN — SODIUM CHLORIDE, SODIUM LACTATE, POTASSIUM CHLORIDE, AND CALCIUM CHLORIDE 125 ML/HR: .6; .31; .03; .02 INJECTION, SOLUTION INTRAVENOUS at 00:36

## 2022-05-04 NOTE — NURSING NOTE
At  1130  Pt reported she has incidentally removed her NG- TUBE, pt was not in any distress, reached out to surgery, spoke with reed Fajardo to maintained pt on NPO status at this point

## 2022-05-04 NOTE — QUICK NOTE
Patient accidentally pulled out NG tube  Strict NPO sips with meds for now  Will have to reinsert NGT if she becomes nauseous or vomits  Encourage ambulation TID

## 2022-05-04 NOTE — PLAN OF CARE
Problem: GASTROINTESTINAL - ADULT  Goal: Minimal or absence of nausea and/or vomiting  Description: INTERVENTIONS:  - Administer IV fluids if ordered to ensure adequate hydration  - Administer ordered antiemetic medications as needed  - Provide nonpharmacologic comfort measures as appropriate  - Advance diet as tolerated, if ordered  - Consider nutrition services referral to assist patient with adequate nutrition and appropriate food choices  Outcome: Progressing  Goal: Maintains or returns to baseline bowel function  Description: INTERVENTIONS:  - Assess bowel function  - Encourage oral fluids to ensure adequate hydration  - Administer IV fluids if ordered to ensure adequate hydration  - Administer ordered medications as needed  - Encourage mobilization and activity  - Consider nutritional services referral to assist patient with adequate nutrition and appropriate food choices  Outcome: Progressing     Problem: PAIN - ADULT  Goal: Verbalizes/displays adequate comfort level or baseline comfort level  Description: Interventions:  - Encourage patient to monitor pain and request assistance  - Assess pain using appropriate pain scale  - Administer analgesics based on type and severity of pain and evaluate response  - Implement non-pharmacological measures as appropriate and evaluate response  - Consider cultural and social influences on pain and pain management  - Notify physician/advanced practitioner if interventions unsuccessful or patient reports new pain  Outcome: Progressing     Problem: Knowledge Deficit  Goal: Patient/family/caregiver demonstrates understanding of disease process, treatment plan, medications, and discharge instructions  Description: Complete learning assessment and assess knowledge base    Interventions:  - Provide teaching at level of understanding  - Provide teaching via preferred learning methods  Outcome: Progressing     Problem: Nutrition/Hydration-ADULT  Goal: Nutrient/Hydration intake appropriate for improving, restoring or maintaining nutritional needs  Description: Monitor and assess patient's nutrition/hydration status for malnutrition  Collaborate with interdisciplinary team and initiate plan and interventions as ordered  Monitor patient's weight and dietary intake as ordered or per policy  Utilize nutrition screening tool and intervene as necessary  Determine patient's food preferences and provide high-protein, high-caloric foods as appropriate       INTERVENTIONS:  - Monitor oral intake, urinary output, labs, and treatment plans  - Assess nutrition and hydration status and recommend course of action  - Evaluate amount of meals eaten  - Assist patient with eating if necessary   - - Provide nutrition/hydration education as appropriate  - Include patient/family/caregiver in decisions related to nutrition  Outcome: Progressing

## 2022-05-04 NOTE — UTILIZATION REVIEW
Continued Stay Review    Date: 5-4-22                       Current Patient Class: inpatient  Current Level of Care: med surg     HPI:61 y o  female initially admitted on 4-27  For small bowel obstruction     Assessment/Plan:       Post op day 1 diagnostic laparoscopy and lysis of adhesions  Abdomen moderately distended but improving  NGT to suction with 900 ml dark green bilious fluid  Pain minimal   Plan to clamp NGT later this afternoon with serial abdominal exams  Encourage ambulation  Continue iv lactated ringers 125/hr  Vital Signs:       Date/Time Temp Pulse Resp BP MAP (mmHg) SpO2 O2 Device   05/04/22 0746 -- -- -- -- -- 92 % None (Room air)   05/04/22 07:18:01 98 1 °F (36 7 °C) 83 -- 122/74 90 92 % --   05/03/22 22:44:19 98 6 °F (37 °C) 85 18 118/79 92 93 % --           Pertinent Labs/Diagnostic Results:       Results from last 7 days   Lab Units 05/04/22 0543 05/03/22  0542 05/02/22  0532 05/01/22  0515 05/01/22  0515 04/30/22  0555 04/30/22  0555 04/29/22  0429 04/29/22  0429 04/28/22  1031 04/28/22  1031 04/27/22  2229 04/27/22  1549   WBC Thousand/uL 12 32* 9 12 7 88   < > 7 13   < > 6 75   < > 6 65   < > 7 44  --  10 26*   HEMOGLOBIN g/dL 14 2 15 0 14 5  --  15 2  --  15 0   < > 15 1   < > 15 4  --  17 0*   HEMATOCRIT % 41 6 44 1 42 9  --  43 4  --  42 9   < > 44 3   < > 45 1  --  47 9*   PLATELETS Thousands/uL 309 314 290   < > 294   < > 281   < > 260   < > 266   < > 292   NEUTROS ABS Thousands/µL  --   --   --   --  4 57  --   --   --   --   --  5 29  --  8 00*   BANDS PCT % 8  --  1  --   --   --   --   --  9*  --   --   --   --     < > = values in this interval not displayed           Results from last 7 days   Lab Units 05/04/22 0543 05/03/22  0542 05/02/22  0532 05/01/22  0515 04/30/22  0555 04/29/22  0429 04/28/22  1031   SODIUM mmol/L 134* 131* 131* 133* 131*   < > 134*   POTASSIUM mmol/L 4 5 4 4 4 4 4 0 4 0   < > 4 6   CHLORIDE mmol/L 98* 97* 97* 96* 95*   < > 100   CO2 mmol/L 27 26 26 30 28   < > 30   ANION GAP mmol/L 9 8 8 7 8   < > 4   BUN mg/dL 18 13 17 20 19   < > 24   CREATININE mg/dL 0 60 0 63 0 57* 0 65 0 66   < > 0 95   EGFR ml/min/1 73sq m 97 95 98 94 94   < > 63   CALCIUM mg/dL 8 9 8 6 8 4 8 8 8 9   < > 9 0   MAGNESIUM mg/dL 2 1  --   --   --   --   --  1 9   PHOSPHORUS mg/dL 4 3*  --   --   --   --   --   --     < > = values in this interval not displayed       Results from last 7 days   Lab Units 04/27/22  1549   AST U/L 21   ALT U/L 25   ALK PHOS U/L 100   TOTAL PROTEIN g/dL 8 2   ALBUMIN g/dL 4 2   TOTAL BILIRUBIN mg/dL 1 34*     Results from last 7 days   Lab Units 05/03/22  1335 05/03/22  1132   POC GLUCOSE mg/dl 134 109     Results from last 7 days   Lab Units 05/04/22  0543 05/03/22  0542 05/02/22  0532 05/01/22  0515 04/30/22  0555 04/29/22  0429 04/28/22  1031 04/27/22  1549   GLUCOSE RANDOM mg/dL 113 136 134 116 131 143* 111 134         Results from last 7 days   Lab Units 05/03/22  0542   HEMOGLOBIN A1C % 5 3   EAG mg/dl 105       Results from last 7 days   Lab Units 04/27/22  1549   HS TNI 0HR ng/L 3       Results from last 7 days   Lab Units 04/27/22  1549   LIPASE u/L 149           Results from last 7 days   Lab Units 04/27/22  1601   CLARITY UA  Clear   COLOR UA  Yellow   SPEC GRAV UA  >=1 030   PH UA  5 5   GLUCOSE UA mg/dl Negative   KETONES UA mg/dl Negative   BLOOD UA  Negative   PROTEIN UA mg/dl Negative   NITRITE UA  Negative   BILIRUBIN UA  Negative   UROBILINOGEN UA E U /dl 0 2   LEUKOCYTES UA  Negative       Scheduled Medications:  heparin (porcine), 5,000 Units, Subcutaneous, Q8H LIZZETTE  lidocaine, 1 patch, Topical, Daily      Continuous IV Infusions:  lactated ringers, 125 mL/hr, Intravenous, Continuous      PRN Meds:  acetaminophen, 650 mg, Per NG Tube, Q6H PRN  calcium carbonate, 1,000 mg, Oral, Daily PRN  morphine injection, 2 mg, Intravenous, Q4H PRN  ondansetron, 4 mg, Intravenous, Q6H PRN  saliva substitute, 5 spray, Mouth/Throat, 4x Daily PRN        Discharge Plan: to be determined     Network Utilization Review Department  ATTENTION: Please call with any questions or concerns to [phone] and carefully listen to the prompts so that you are directed to the right person  All voicemails are confidential   Jazmin Mccall all requests for admission clinical reviews, approved or denied determinations and any other requests to dedicated fax number below belonging to the campus where the patient is receiving treatment   List of dedicated fax numbers for the Facilities:  [address] DENIALS (Administrative/Medical Necessity) [phone]   [address] (Maternity/NICU/Pediatrics) [phone]   [address] [place] [address]  [address] [facility] [place] [address] [id number] [place] [address] [address] [facility] [place] [address] [phone]

## 2022-05-04 NOTE — CASE MANAGEMENT
Case Management Progress Note    Patient name Hunter Alonso  Location /-30 MRN [id number]  : 1960 Date 2022       LOS (days): 7  Geometric Mean LOS (GMLOS) (days):   Days to GMLOS:        OBJECTIVE:        Current admission status: Inpatient  Preferred Pharmacy:   [address] [place] [address]  Phone: [phone] Fax: [phone]    Primary Care Provider: Liz Diallo DO    Primary Insurance: BLUE CROSS  Secondary Insurance:     PROGRESS NOTE:  As per rounds this morning, patient is not anticipated for dc for another 72 hrs  POD 1 following diagnosti laparoscopy  still with NGT  Discharge is dependent on return of bowel function/diet tolerance  CM Dept will continue to follow

## 2022-05-04 NOTE — PROGRESS NOTES
Progress Note - General Surgery   Kimberly Mi 59 y o  female MRN: [id number]  Unit/Bed#: -01 Encounter: [id number]    Assessment:  Javier Magdaleno is a 59 y o  female POD1 s/p diagnostic laparoscopy, lysis of adhesions    - Afebrile, VSS  WBC 12 from 9    - Passing flatus, no BM yet  Minimal abdominal pain  Moderately distended but improved from yesterday   dark green, bilious     Plan:   Continue NPO/NGT until bowel function returns  Possibly clamp NGT this afternoon and see how she tolerates it   Serial abdominal exams   Monitor vitals and labs   PRN pain medication and anti-emetics   Encourage ambulation   DVT ppx: heparin   Incentive spirometry 10 times/hour while awake   Continue home medications as prescribed     Subjective/Objective    Subjective: Patient is feeling well today  Reports flatus but no BM  No abdominal pain  Objective:     Blood pressure 122/74, pulse 83, temperature 98 1 °F (36 7 °C), resp  rate 18, height 5' 8" (1 727 m), weight 83 kg (182 lb 15 7 oz), SpO2 92 %, not currently breastfeeding  ,Body mass index is 27 82 kg/m²  Intake/Output Summary (Last 24 hours) at 5/4/2022 1027  Last data filed at [facility] 84  Gross per 24 hour   Intake 1600 ml   Output 3285 ml   Net -1685 ml       Invasive Devices  Report    Peripheral Intravenous Line            Peripheral IV 05/01/22 Left Antecubital 2 days    Peripheral IV 05/03/22 Distal;Dorsal (posterior); Left Forearm <1 day          Drain            NG/OG/Enteral Tube Nasogastric 14 Fr Left nare 3 days                Physical Exam:   GEN: NAD  HEENT: NCAT, MMM  CV: RRR, no m/r/g  Lung: Normal effort, CTA B/L, no w/r/r  Ab: NT, moderately distended but improved from yesterday  Dressings c/d/i  Extrem: No CCE   Neuro: A+Ox3     Lab, Imaging and other studies:I have personally reviewed pertinent lab results       VTE Pharmacologic Prophylaxis: Heparin  VTE Mechanical Prophylaxis: sequential compression device    Recent Results (from the past 36 hour(s))   CBC    Collection Time: 05/03/22  5:42 AM   Result Value Ref Range    WBC 9 12 4 31 - 10 16 Thousand/uL    RBC 4 76 3 81 - 5 12 Million/uL    Hemoglobin 15 0 11 5 - 15 4 g/dL    Hematocrit 44 1 34 8 - 46 1 %    MCV 93 82 - 98 fL    MCH 31 5 26 8 - 34 3 pg    MCHC 34 0 31 4 - 37 4 g/dL    RDW 11 8 11 6 - 15 1 %    Platelets 088 531 - 008 Thousands/uL    MPV 9 3 8 9 - 12 7 fL   Basic metabolic panel    Collection Time: 05/03/22  5:42 AM   Result Value Ref Range    Sodium 131 (L) 136 - 145 mmol/L    Potassium 4 4 3 5 - 5 3 mmol/L    Chloride 97 (L) 100 - 108 mmol/L    CO2 26 21 - 32 mmol/L    ANION GAP 8 4 - 13 mmol/L    BUN 13 5 - 25 mg/dL    Creatinine 0 63 0 60 - 1 30 mg/dL    Glucose 136 65 - 140 mg/dL    Calcium 8 6 8 3 - 10 1 mg/dL    eGFR 95 ml/min/1 73sq m   Prealbumin    Collection Time: 05/03/22  5:42 AM   Result Value Ref Range    Prealbumin 18 8 18 0 - 40 0 mg/dL   Hemoglobin A1C    Collection Time: 05/03/22  5:42 AM   Result Value Ref Range    Hemoglobin A1C 5 3 Normal 3 8-5 6%; PreDiabetic 5 7-6 4%;  Diabetic >=6 5%; Glycemic control for adults with diabetes <7 0% %     mg/dl   Type and screen    Collection Time: 05/03/22  9:03 AM   Result Value Ref Range    ABO Grouping AB     Rh Factor Positive     Antibody Screen Negative     Specimen Expiration Date [id number]    ABOR Recheck - Contact Blood Bank Prior to Collection    Collection Time: 05/03/22 10:08 AM   Result Value Ref Range    ABO Grouping AB     Rh Factor Positive    Fingerstick Glucose (POCT)    Collection Time: 05/03/22 11:32 AM   Result Value Ref Range    POC Glucose 109 65 - 140 mg/dl   Fingerstick Glucose (POCT)    Collection Time: 05/03/22  1:35 PM   Result Value Ref Range    POC Glucose 134 65 - 140 mg/dl   CBC and differential    Collection Time: 05/04/22  5:43 AM   Result Value Ref Range    WBC 12 32 (H) 4 31 - 10 16 Thousand/uL    RBC 4 44 3 81 - 5 12 Million/uL    Hemoglobin 14 2 11 5 - 15 4 g/dL    Hematocrit 41 6 34 8 - 46 1 %    MCV 94 82 - 98 fL    MCH 32 0 26 8 - 34 3 pg    MCHC 34 1 31 4 - 37 4 g/dL    RDW 11 9 11 6 - 15 1 %    MPV 9 1 8 9 - 12 7 fL    Platelets 506 070 - 469 Thousands/uL   Basic metabolic panel    Collection Time: 05/04/22  5:43 AM   Result Value Ref Range    Sodium 134 (L) 136 - 145 mmol/L    Potassium 4 5 3 5 - 5 3 mmol/L    Chloride 98 (L) 100 - 108 mmol/L    CO2 27 21 - 32 mmol/L    ANION GAP 9 4 - 13 mmol/L    BUN 18 5 - 25 mg/dL    Creatinine 0 60 0 60 - 1 30 mg/dL    Glucose 113 65 - 140 mg/dL    Calcium 8 9 8 3 - 10 1 mg/dL    eGFR 97 ml/min/1 73sq m   Magnesium    Collection Time: 05/04/22  5:43 AM   Result Value Ref Range    Magnesium 2 1 1 6 - 2 6 mg/dL   Phosphorus    Collection Time: 05/04/22  5:43 AM   Result Value Ref Range    Phosphorus 4 3 (H) 2 3 - 4 1 mg/dL   Manual Differential(PHLEBS Do Not Order)    Collection Time: 05/04/22  5:43 AM   Result Value Ref Range    Segmented % 67 43 - 75 %    Bands % 8 0 - 8 %    Lymphocytes % 14 14 - 44 %    Monocytes % 10 4 - 12 %    Eosinophils, % 0 0 - 6 %    Basophils % 0 0 - 1 %    Myelocytes % 1 0 - 1 %    Absolute Neutrophils 9 24 (H) 1 85 - 7 62 Thousand/uL    Lymphocytes Absolute 1 72 0 60 - 4 47 Thousand/uL    Monocytes Absolute 1 23 (H) 0 00 - 1 22 Thousand/uL    Eosinophils Absolute 0 00 0 00 - 0 40 Thousand/uL    Basophils Absolute 0 00 0 00 - 0 10 Thousand/uL    Total Counted      RBC Morphology Normal     Platelet Estimate Adequate Adequate

## 2022-05-05 VITALS
WEIGHT: 182.98 LBS | HEIGHT: 68 IN | HEART RATE: 84 BPM | SYSTOLIC BLOOD PRESSURE: 99 MMHG | DIASTOLIC BLOOD PRESSURE: 62 MMHG | BODY MASS INDEX: 27.73 KG/M2 | TEMPERATURE: 98.6 F | RESPIRATION RATE: 19 BRPM | OXYGEN SATURATION: 95 %

## 2022-05-05 LAB
ANION GAP SERPL CALCULATED.3IONS-SCNC: 7 MMOL/L (ref 4–13)
BUN SERPL-MCNC: 19 MG/DL (ref 5–25)
CALCIUM SERPL-MCNC: 8.9 MG/DL (ref 8.3–10.1)
CHLORIDE SERPL-SCNC: 101 MMOL/L (ref 100–108)
CO2 SERPL-SCNC: 30 MMOL/L (ref 21–32)
CREAT SERPL-MCNC: 0.72 MG/DL (ref 0.6–1.3)
ERYTHROCYTE [DISTWIDTH] IN BLOOD BY AUTOMATED COUNT: 11.9 % (ref 11.6–15.1)
GFR SERPL CREATININE-BSD FRML MDRD: 89 ML/MIN/1.73SQ M
GLUCOSE SERPL-MCNC: 82 MG/DL (ref 65–140)
HCT VFR BLD AUTO: 41.5 % (ref 34.8–46.1)
HGB BLD-MCNC: 13.7 G/DL (ref 11.5–15.4)
MCH RBC QN AUTO: 31.6 PG (ref 26.8–34.3)
MCHC RBC AUTO-ENTMCNC: 33 G/DL (ref 31.4–37.4)
MCV RBC AUTO: 96 FL (ref 82–98)
PLATELET # BLD AUTO: 279 THOUSANDS/UL (ref 149–390)
PMV BLD AUTO: 9.4 FL (ref 8.9–12.7)
POTASSIUM SERPL-SCNC: 4.3 MMOL/L (ref 3.5–5.3)
RBC # BLD AUTO: 4.33 MILLION/UL (ref 3.81–5.12)
SODIUM SERPL-SCNC: 138 MMOL/L (ref 136–145)
WBC # BLD AUTO: 8.99 THOUSAND/UL (ref 4.31–10.16)

## 2022-05-05 PROCEDURE — 99024 POSTOP FOLLOW-UP VISIT: CPT | Performed by: STUDENT IN AN ORGANIZED HEALTH CARE EDUCATION/TRAINING PROGRAM

## 2022-05-05 PROCEDURE — 85027 COMPLETE CBC AUTOMATED: CPT

## 2022-05-05 PROCEDURE — 80048 BASIC METABOLIC PNL TOTAL CA: CPT

## 2022-05-05 RX ORDER — NYSTATIN 100000 [USP'U]/G
POWDER TOPICAL 2 TIMES DAILY
Status: DISCONTINUED | OUTPATIENT
Start: 2022-05-05 | End: 2022-05-06 | Stop reason: HOSPADM

## 2022-05-05 RX ADMIN — HEPARIN SODIUM 5000 UNITS: 5000 INJECTION INTRAVENOUS; SUBCUTANEOUS at 21:29

## 2022-05-05 RX ADMIN — SODIUM CHLORIDE, SODIUM LACTATE, POTASSIUM CHLORIDE, AND CALCIUM CHLORIDE 125 ML/HR: .6; .31; .03; .02 INJECTION, SOLUTION INTRAVENOUS at 21:48

## 2022-05-05 RX ADMIN — LIDOCAINE 5% 1 PATCH: 700 PATCH TOPICAL at 08:41

## 2022-05-05 RX ADMIN — HEPARIN SODIUM 5000 UNITS: 5000 INJECTION INTRAVENOUS; SUBCUTANEOUS at 05:06

## 2022-05-05 RX ADMIN — SODIUM CHLORIDE, SODIUM LACTATE, POTASSIUM CHLORIDE, AND CALCIUM CHLORIDE 125 ML/HR: .6; .31; .03; .02 INJECTION, SOLUTION INTRAVENOUS at 12:11

## 2022-05-05 RX ADMIN — HEPARIN SODIUM 5000 UNITS: 5000 INJECTION INTRAVENOUS; SUBCUTANEOUS at 14:28

## 2022-05-05 RX ADMIN — NYSTATIN: 100000 POWDER TOPICAL at 22:41

## 2022-05-05 RX ADMIN — SODIUM CHLORIDE, SODIUM LACTATE, POTASSIUM CHLORIDE, AND CALCIUM CHLORIDE 125 ML/HR: .6; .31; .03; .02 INJECTION, SOLUTION INTRAVENOUS at 03:46

## 2022-05-05 NOTE — DISCHARGE INSTRUCTIONS
Follow up: Following discharge from the hospital call the office in 1-2 days to set up a post operative appointment to be seen in 2 weeks by Dr Mike Chavez  [phone]  Call the office if you have increased pain not relieved with pain medicine  Call the office if you have a fever,redness, the wound opens up, you have pus draining from your incision  AFTER YOU LEAVE: Following discharge from the hospital, you may have some questions about your procedure, your activities or your general condition  These instructions may answer some of your questions and help you adjust during the first few days following your operation  You can expect to be sore and tender mostly around the incisions  This pain should last approximally 5 days and gradually improve daily  Incisions:    - You may apply ice to the incisions to help with pain  Avoid heat as this may make the glue tacky   - It is normal to have some bruising, swelling or mild discoloration around the incision  If increasing redness or pain develops, call our office immediately  Do not apply any creams, lotions, or ointments  If you have dressings:  - You no longer need any dressings over your incisions  There are steri strips in place and absorbable sutures closing the wound  Leave the steri strips in place for 5-7 days  They may fall off on their own  This is OK  Bathing:   - You may shower daily with soap and water the day after the procedure  It is OK to GENTLY wash the incision with soap and water then pat dry  DO NOT SCRUB  Do NOT soak incision in a tub, pool, or hot tub for 2 weeks  Diet:   - Resume your normal diet unless specified otherwise  We recommend you slowly advance your diet  Try to start with softer bland foods and gradually advance as tolerated  Be sure to consume plenty of water  Avoid alcohol          Activity/Restrictions:   - The evening following the procedure you should rest as much as possible, sitting, lying or reclining  you should be sure someone remains with you until the next morning  Gradually increase your activity daily  Walking 3-4 times daily is good and stairs are ok  Listen to your body  If you start to get tired or sore then rest    - No strenuous activity or exercise for 6 weeks  - No heavy lifting, pushing or pulling    - No driving for 5 days or while taking narcotics for pain  Return or work: You may return to work or other activities as soon as your pain is controlled and you feel comfortable  For many people, this is 5 to 7 days after surgery  If your job requires heavy lifting you will need to be on light duty for 6 weeks  Medication:   - If you were given a prescription for Percocet, Norco, or Vicodin for pain be sure to eat prior to taking as these medications as they may cause nausea and vomiting on an empty stomach     - If you do not want to take stronger medications for pain, you may take Tylenol, Aleve OR Ibuprofen  - DO NOT take Tylenol  (acetaminophen) with these medication for a fever or for further pain control as these medications already contain Tylenol in them  Take one or the other  Do not exceed more than 4000 mg of acetaminophen in 24 hours or 3000 mg if you have liver disease  - If you were given an antibiotic take it until it is finished  Contact your healthcare provider if:   · You have a fever over 101°F (38°C) or chills  · You have pain or nausea that is not relieved by medicine  · You have redness and swelling around your incisions, or blood or pus is leaking             from your incisions  · You are constipated or have diarrhea  · Your skin or eyes are yellow, or your bowel movements are pale  · You have questions or concerns about your surgery, condition, or care  Seek care immediately or call 911 if:   · You cannot stop vomiting  · Your bowel movements are black or bloody  · You have pain in your abdomen and it is swollen or hard  · Your arm or leg feels warm, tender, and painful  It may look swollen and red  · You feel lightheaded, short of breath, and have chest pain  · You cough up blood  Bowel Obstruction   WHAT YOU NEED TO KNOW:   A bowel obstruction is a partial or complete blockage of your intestine  Your small or large intestine may be affected  The blockage prevents food and waste from passing through normally  DISCHARGE INSTRUCTIONS:   Return to the emergency department if:   · You have severe abdominal pain that does not get better  · Your heart is beating faster than normal for you  · You have a fever  Call your doctor if:   · You have nausea and are vomiting  · Your abdomen is enlarged  · You cannot pass a bowel movement or gas  · You lose weight without trying  · You have blood in your bowel movement  · You have questions or concerns about your condition or care  Medicines: You may  need any of the following:  · Laxatives  may help your intestines work properly  They also help soften your bowel movement to make it easier to pass  · NSAIDs  help decrease swelling and pain or fever  This medicine is available with or without a doctor's order  NSAIDs can cause stomach bleeding or kidney problems in certain people  If you take blood thinner medicine, always ask your healthcare provider if NSAIDs are safe for you  Always read the medicine label and follow directions  · Antibiotics  may be given to treat or prevent a bacterial infection  · Take your medicine as directed  Contact your healthcare provider if you think your medicine is not helping or if you have side effects  Tell him of her if you are allergic to any medicine  Keep a list of the medicines, vitamins, and herbs you take  Include the amounts, and when and why you take them  Bring the list or the pill bottles to follow-up visits  Carry your medicine list with you in case of an emergency      Follow up with your doctor as directed:  Write down your questions so you remember to ask them during your visits  © Copyright [address] 2022 Information is for End User's use only and may not be sold, redistributed or otherwise used for commercial purposes  All illustrations and images included in CareNotes® are the copyrighted property of A D A M , Inc  or [facility] Azael Noriega   The above information is an  only  It is not intended as medical advice for individual conditions or treatments  Talk to your doctor, nurse or pharmacist before following any medical regimen to see if it is safe and effective for you

## 2022-05-05 NOTE — PLAN OF CARE
Problem: GASTROINTESTINAL - ADULT  Goal: Minimal or absence of nausea and/or vomiting  Description: INTERVENTIONS:  - Administer IV fluids if ordered to ensure adequate hydration  - Administer ordered antiemetic medications as needed  - Provide nonpharmacologic comfort measures as appropriate  - Advance diet as tolerated, if ordered  - Consider nutrition services referral to assist patient with adequate nutrition and appropriate food choices  Outcome: Progressing  Goal: Maintains or returns to baseline bowel function  Description: INTERVENTIONS:  - Assess bowel function  - Encourage oral fluids to ensure adequate hydration  - Administer IV fluids if ordered to ensure adequate hydration  - Administer ordered medications as needed  - Encourage mobilization and activity  - Consider nutritional services referral to assist patient with adequate nutrition and appropriate food choices  Outcome: Progressing     Problem: INFECTION - ADULT  Goal: Absence or prevention of progression during hospitalization  Description: INTERVENTIONS:  - Assess and monitor for signs and symptoms of infection  - Monitor lab/diagnostic results  - Monitor all insertion sites, i e  indwelling lines, tubes, and drains  - [place] appropriate cooling/warming therapies per order  - Administer medications as ordered  - Instruct and encourage patient and family to use good hand hygiene technique  - Identify and instruct in appropriate isolation precautions for identified infection/condition  Outcome: Progressing     Problem: PAIN - ADULT  Goal: Verbalizes/displays adequate comfort level or baseline comfort level  Description: Interventions:  - Encourage patient to monitor pain and request assistance  - Assess pain using appropriate pain scale  - Administer analgesics based on type and severity of pain and evaluate response  - Implement non-pharmacological measures as appropriate and evaluate response  - Consider cultural and social influences on pain and pain management  - Notify physician/advanced practitioner if interventions unsuccessful or patient reports new pain  Outcome: Progressing

## 2022-05-05 NOTE — PLAN OF CARE
Problem: GASTROINTESTINAL - ADULT  Goal: Minimal or absence of nausea and/or vomiting  Description: INTERVENTIONS:  - Administer IV fluids if ordered to ensure adequate hydration  - Administer ordered antiemetic medications as needed  - Provide nonpharmacologic comfort measures as appropriate  - Advance diet as tolerated, if ordered  - Consider nutrition services referral to assist patient with adequate nutrition and appropriate food choices  Outcome: Progressing  Goal: Maintains or returns to baseline bowel function  Description: INTERVENTIONS:  - Assess bowel function  - Encourage oral fluids to ensure adequate hydration  - Administer IV fluids if ordered to ensure adequate hydration  - Administer ordered medications as needed  - Encourage mobilization and activity  - Consider nutritional services referral to assist patient with adequate nutrition and appropriate food choices  Outcome: Progressing     Problem: DISCHARGE PLANNING  Goal: Discharge to home or other facility with appropriate resources  Description: INTERVENTIONS:  - Identify barriers to discharge w/patient and caregiver  - Arrange for needed discharge resources and transportation as appropriate  - Identify discharge learning needs (meds, wound care, etc )  - Arrange for interpretive services to assist at discharge as needed  - Refer to Case Management Department for coordinating discharge planning if the patient needs post-hospital services based on physician/advanced practitioner order or complex needs related to functional status, cognitive ability, or social support system  Outcome: Progressing     Problem: Knowledge Deficit  Goal: Patient/family/caregiver demonstrates understanding of disease process, treatment plan, medications, and discharge instructions  Description: Complete learning assessment and assess knowledge base    Interventions:  - Provide teaching at level of understanding  - Provide teaching via preferred learning methods  Outcome: Progressing

## 2022-05-05 NOTE — APP STUDENT NOTE
[facility] STUDENT  Inpatient Progress Note for TRAINING ONLY  Not Part of Legal Medical Record       Progress Note - Elizabeth Mi 59 y o  female MRN: [id number]    Unit/Bed#: [facility] Encounter: [id number]      Assessment:  POD2 s/p diagnostic laparoscopy, lysis of adhesions  - afebrile, VSS, WBC 8 99  - passing flatus, had 2 small BM last night  No abdominal pain, nausea, or vomiting    Plan:  - Advance to clear liquids and clear ensures  - Continue IVF until adequate oral intake  - Monitor bowel function  - Serial abdominal exams  - Encourage ambulation  - Continue home medications as prescribed  - Monitor vitals and labs  - PRN analgesics and antiemetics  - DVT ppx: heparin  - Incentive spirometry 10 times/hour while awake    Subjective:   Patient is in high spirits as she reports two small bowel movements last night  Continues to pass gas  Denies nausea, vomiting, abdominal pain, back pain  Slept well without NG tube  Reports increased appetite  Objective:     Vitals: Blood pressure 110/72, pulse 74, temperature 98 °F (36 7 °C), resp  rate 18, height 5' 8" (1 727 m), weight 83 kg (182 lb 15 7 oz), SpO2 93 %, not currently breastfeeding  ,Body mass index is 27 82 kg/m²  No intake or output data in the 24 hours ending 05/05/22 1052    Physical Exam:    General: no acute distress, pleasant and cooperative  HEENT: NC/AT, MMM  CV: RRR with no r/m/g  Respiratory: normal effort; lungs CTA bilaterally with no w/r/r  Abdomen: Soft, mild distention but markedly improved from yesterday  No tenderness to palpation  Dressings without drainage, surrounding erythema or swelling of incisions  Back: no tenderness to lower back, no deformity noted  Extremities: No calf tenderness, cyanosis, or edema noted  Neuro: A & O x3    Invasive Devices  Report    Peripheral Intravenous Line            Peripheral IV 05/01/22 Left Antecubital 3 days    Peripheral IV 05/03/22 Distal;Dorsal (posterior); Left Forearm 1 day Lab, Imaging and other studies: I have personally reviewed pertinent reports      VTE Pharmacologic Prophylaxis: Heparin  VTE Mechanical Prophylaxis: sequential compression device

## 2022-05-05 NOTE — PROGRESS NOTES
Progress Note - General Surgery   Marleydavid Mi 59 y o  female MRN: [id number]  Unit/Bed#: -01 Encounter: [id number]    Assessment:  Ema Blancas is a 59 y o  female POD2 s/p diagnostic laparoscopy, lysis of adhesions    -AVSS, WBC 8 9 from 12 3   -2 BM's last night, one BM this morning  Passing flatus  No N/V  Plan:   Advance to CLD and clear ensures    Continue IVF until adequate oral intake  · Serial abdominal exams  · Monitor vitals and labs   PRN pain medication and anti-emetics   Encourage ambulation   DVT ppx: heparin   Incentive spirometry 10 times/hour while awake   Continue home medications as prescribed    Plan for dc tomorrow if tolerating diet and having BF  Subjective/Objective    Subjective: Patient feeling well  States she is hungry  2 BM's last night, one BM this morning  Passing flatus  No N/V  Objective:     Blood pressure 110/72, pulse 74, temperature 98 °F (36 7 °C), resp  rate 18, height 5' 8" (1 727 m), weight 83 kg (182 lb 15 7 oz), SpO2 93 %, not currently breastfeeding  ,Body mass index is 27 82 kg/m²  No intake or output data in the 24 hours ending 05/05/22 0956    Invasive Devices  Report    Peripheral Intravenous Line            Peripheral IV 05/01/22 Left Antecubital 3 days    Peripheral IV 05/03/22 Distal;Dorsal (posterior); Left Forearm 1 day                Physical Exam:   GEN: NAD  HEENT: NCAT, MMM  CV: RRR, no m/r/g  Lung: Normal effort, CTA B/L, no w/r/r  Ab: Soft, NT, mild distention - improving  Incisions without erythema or exudate  Steri strips in place with minimal drainage  Extrem: No CCE   Neuro: A+Ox3     Lab, Imaging and other studies:I have personally reviewed pertinent lab results       VTE Pharmacologic Prophylaxis: Heparin  VTE Mechanical Prophylaxis: sequential compression device    Recent Results (from the past 36 hour(s))   CBC and differential    Collection Time: 05/04/22  5:43 AM   Result Value Ref Range    WBC 12 32 (H) 4 31 - 10 16 Thousand/uL    RBC 4 44 3 81 - 5 12 Million/uL    Hemoglobin 14 2 11 5 - 15 4 g/dL    Hematocrit 41 6 34 8 - 46 1 %    MCV 94 82 - 98 fL    MCH 32 0 26 8 - 34 3 pg    MCHC 34 1 31 4 - 37 4 g/dL    RDW 11 9 11 6 - 15 1 %    MPV 9 1 8 9 - 12 7 fL    Platelets 705 457 - 150 Thousands/uL   Basic metabolic panel    Collection Time: 05/04/22  5:43 AM   Result Value Ref Range    Sodium 134 (L) 136 - 145 mmol/L    Potassium 4 5 3 5 - 5 3 mmol/L    Chloride 98 (L) 100 - 108 mmol/L    CO2 27 21 - 32 mmol/L    ANION GAP 9 4 - 13 mmol/L    BUN 18 5 - 25 mg/dL    Creatinine 0 60 0 60 - 1 30 mg/dL    Glucose 113 65 - 140 mg/dL    Calcium 8 9 8 3 - 10 1 mg/dL    eGFR 97 ml/min/1 73sq m   Magnesium    Collection Time: 05/04/22  5:43 AM   Result Value Ref Range    Magnesium 2 1 1 6 - 2 6 mg/dL   Phosphorus    Collection Time: 05/04/22  5:43 AM   Result Value Ref Range    Phosphorus 4 3 (H) 2 3 - 4 1 mg/dL   Manual Differential(PHLEBS Do Not Order)    Collection Time: 05/04/22  5:43 AM   Result Value Ref Range    Segmented % 67 43 - 75 %    Bands % 8 0 - 8 %    Lymphocytes % 14 14 - 44 %    Monocytes % 10 4 - 12 %    Eosinophils, % 0 0 - 6 %    Basophils % 0 0 - 1 %    Myelocytes % 1 0 - 1 %    Absolute Neutrophils 9 24 (H) 1 85 - 7 62 Thousand/uL    Lymphocytes Absolute 1 72 0 60 - 4 47 Thousand/uL    Monocytes Absolute 1 23 (H) 0 00 - 1 22 Thousand/uL    Eosinophils Absolute 0 00 0 00 - 0 40 Thousand/uL    Basophils Absolute 0 00 0 00 - 0 10 Thousand/uL    Total Counted      RBC Morphology Normal     Platelet Estimate Adequate Adequate   CBC and differential    Collection Time: 05/05/22  4:58 AM   Result Value Ref Range    WBC 8 99 4 31 - 10 16 Thousand/uL    RBC 4 33 3 81 - 5 12 Million/uL    Hemoglobin 13 7 11 5 - 15 4 g/dL    Hematocrit 41 5 34 8 - 46 1 %    MCV 96 82 - 98 fL    MCH 31 6 26 8 - 34 3 pg    MCHC 33 0 31 4 - 37 4 g/dL    RDW 11 9 11 6 - 15 1 %    MPV 9 4 8 9 - 12 7 fL    Platelets 662 481 - 611 Thousands/uL   Basic metabolic panel    Collection Time: 05/05/22  4:58 AM   Result Value Ref Range    Sodium 138 136 - 145 mmol/L    Potassium 4 3 3 5 - 5 3 mmol/L    Chloride 101 100 - 108 mmol/L    CO2 30 21 - 32 mmol/L    ANION GAP 7 4 - 13 mmol/L    BUN 19 5 - 25 mg/dL    Creatinine 0 72 0 60 - 1 30 mg/dL    Glucose 82 65 - 140 mg/dL    Calcium 8 9 8 3 - 10 1 mg/dL    eGFR 89 ml/min/1 73sq m

## 2022-05-06 PROBLEM — K56.609 SMALL BOWEL OBSTRUCTION (HCC): Status: RESOLVED | Noted: 2022-04-28 | Resolved: 2022-05-06

## 2022-05-06 LAB
ANION GAP SERPL CALCULATED.3IONS-SCNC: 5 MMOL/L (ref 4–13)
BASOPHILS # BLD MANUAL: 0 THOUSAND/UL (ref 0–0.1)
BASOPHILS NFR MAR MANUAL: 0 % (ref 0–1)
BUN SERPL-MCNC: 14 MG/DL (ref 5–25)
CALCIUM SERPL-MCNC: 8.6 MG/DL (ref 8.3–10.1)
CHLORIDE SERPL-SCNC: 104 MMOL/L (ref 100–108)
CO2 SERPL-SCNC: 29 MMOL/L (ref 21–32)
CREAT SERPL-MCNC: 0.72 MG/DL (ref 0.6–1.3)
EOSINOPHIL # BLD MANUAL: 0.06 THOUSAND/UL (ref 0–0.4)
EOSINOPHIL NFR BLD MANUAL: 1 % (ref 0–6)
ERYTHROCYTE [DISTWIDTH] IN BLOOD BY AUTOMATED COUNT: 11.9 % (ref 11.6–15.1)
GFR SERPL CREATININE-BSD FRML MDRD: 89 ML/MIN/1.73SQ M
GLUCOSE SERPL-MCNC: 97 MG/DL (ref 65–140)
HCT VFR BLD AUTO: 37.7 % (ref 34.8–46.1)
HGB BLD-MCNC: 13 G/DL (ref 11.5–15.4)
LYMPHOCYTES # BLD AUTO: 1.66 THOUSAND/UL (ref 0.6–4.47)
LYMPHOCYTES # BLD AUTO: 27 % (ref 14–44)
MCH RBC QN AUTO: 31.7 PG (ref 26.8–34.3)
MCHC RBC AUTO-ENTMCNC: 34.5 G/DL (ref 31.4–37.4)
MCV RBC AUTO: 92 FL (ref 82–98)
METAMYELOCYTES NFR BLD MANUAL: 2 % (ref 0–1)
MONOCYTES # BLD AUTO: 0.43 THOUSAND/UL (ref 0–1.22)
MONOCYTES NFR BLD: 7 % (ref 4–12)
NEUTROPHILS # BLD MANUAL: 3.8 THOUSAND/UL (ref 1.85–7.62)
NEUTS SEG NFR BLD AUTO: 62 % (ref 43–75)
PLATELET # BLD AUTO: 252 THOUSANDS/UL (ref 149–390)
PLATELET BLD QL SMEAR: ADEQUATE
PMV BLD AUTO: 9.1 FL (ref 8.9–12.7)
POTASSIUM SERPL-SCNC: 4 MMOL/L (ref 3.5–5.3)
RBC # BLD AUTO: 4.1 MILLION/UL (ref 3.81–5.12)
RBC MORPH BLD: NORMAL
SODIUM SERPL-SCNC: 138 MMOL/L (ref 136–145)
VARIANT LYMPHS # BLD AUTO: 1 %
WBC # BLD AUTO: 6.13 THOUSAND/UL (ref 4.31–10.16)

## 2022-05-06 PROCEDURE — 85027 COMPLETE CBC AUTOMATED: CPT

## 2022-05-06 PROCEDURE — 99024 POSTOP FOLLOW-UP VISIT: CPT | Performed by: STUDENT IN AN ORGANIZED HEALTH CARE EDUCATION/TRAINING PROGRAM

## 2022-05-06 PROCEDURE — 80048 BASIC METABOLIC PNL TOTAL CA: CPT

## 2022-05-06 PROCEDURE — NC001 PR NO CHARGE: Performed by: STUDENT IN AN ORGANIZED HEALTH CARE EDUCATION/TRAINING PROGRAM

## 2022-05-06 PROCEDURE — 85007 BL SMEAR W/DIFF WBC COUNT: CPT

## 2022-05-06 RX ORDER — DOCUSATE SODIUM 100 MG/1
100 CAPSULE, LIQUID FILLED ORAL 2 TIMES DAILY PRN
Status: DISCONTINUED | OUTPATIENT
Start: 2022-05-06 | End: 2022-05-06 | Stop reason: HOSPADM

## 2022-05-06 RX ORDER — DIPHENHYDRAMINE HCL 25 MG
25 TABLET ORAL ONCE
Status: COMPLETED | OUTPATIENT
Start: 2022-05-06 | End: 2022-05-06

## 2022-05-06 RX ORDER — FLUCONAZOLE 100 MG/1
200 TABLET ORAL ONCE
Status: COMPLETED | OUTPATIENT
Start: 2022-05-06 | End: 2022-05-06

## 2022-05-06 RX ADMIN — LIDOCAINE 5% 1 PATCH: 700 PATCH TOPICAL at 09:03

## 2022-05-06 RX ADMIN — FLUCONAZOLE 200 MG: 100 TABLET ORAL at 11:22

## 2022-05-06 RX ADMIN — DIPHENHYDRAMINE HYDROCHLORIDE 25 MG: 25 TABLET ORAL at 11:22

## 2022-05-06 RX ADMIN — SODIUM CHLORIDE, SODIUM LACTATE, POTASSIUM CHLORIDE, AND CALCIUM CHLORIDE 125 ML/HR: .6; .31; .03; .02 INJECTION, SOLUTION INTRAVENOUS at 05:49

## 2022-05-06 RX ADMIN — NYSTATIN 1 APPLICATION: 100000 POWDER TOPICAL at 09:04

## 2022-05-06 RX ADMIN — HEPARIN SODIUM 5000 UNITS: 5000 INJECTION INTRAVENOUS; SUBCUTANEOUS at 05:49

## 2022-05-06 NOTE — PROGRESS NOTES
Patient discharged to home with spouse  Patient verbalized understanding of discharge instructions including diagnosis, follow-up and medications  Patient educated and verbalized understanding of incisional care  No distress noted  Patient escorted out via wheelchair and transportation provided by spouse

## 2022-05-06 NOTE — PROGRESS NOTES
Progress Note -Surgery MONICA Mohr Shmuel 59 y o  female MRN: [id number]  Unit/Bed#: -01 Encounter: [id number]      Assessment    59 y o  female POD3 s/p diagnostic laparoscopy, lysis of adhesions for Small bowel obstruction  - AVSS, no leukocytosis, 6 13  - advance to soft diet last night and tolerating, no n/v, +BM  - skin rash, and erythema around incisions, likely from adhesive, no fluctuance or induration  Plan   -- Continue with diet as tolerated  -- Once dose diflucan and benadryl  For rash  -- DVT prophylaxis  -- stable for discharge today  Follow up in 2 weeks in the surgical office  ______________________________________________________________________  Subjective:   Patient feeling well  Continues to have BM which is more formed  No n/v, tolerating diet  Not requiring pain medication    Objective:    Vitals:  BP 99/62   Pulse 84   Temp 98 6 °F (37 °C)   Resp 19   Ht 5' 8" (1 727 m)   Wt 83 kg (182 lb 15 7 oz)   SpO2 95%   Breastfeeding No   BMI 27 82 kg/m²     I/Os:  I/O last 3 completed shifts: In: 280 [P O :280]  Out: -     No intake/output data recorded  Invasive Devices  Report    Peripheral Intravenous Line            Peripheral IV 05/03/22 Distal;Dorsal (posterior); Left Forearm 2 days              Medications:  Current Facility-Administered Medications   Medication Dose Route Frequency    acetaminophen (TYLENOL) tablet 650 mg  650 mg Per NG Tube Q6H PRN    calcium carbonate (TUMS) chewable tablet 1,000 mg  1,000 mg Oral Daily PRN    heparin (porcine) subcutaneous injection 5,000 Units  5,000 Units Subcutaneous Q8H [address]    lactated ringers infusion  125 mL/hr Intravenous Continuous    lidocaine (LIDODERM) 5 % patch 1 patch  1 patch Topical Daily    morphine injection 2 mg  2 mg Intravenous Q4H PRN    nystatin (MYCOSTATIN) powder   Topical BID    ondansetron (ZOFRAN) injection 4 mg  4 mg Intravenous Q6H PRN    saliva substitute (MOUTH KOTE) mucosal solution 5 spray  5 spray Mouth/Throat 4x Daily PRN     Lab Results and Cultures:   CBC with diff:   Lab Results   Component Value Date    WBC 6 13 05/06/2022    HGB 13 0 05/06/2022    HCT 37 7 05/06/2022    MCV 92 05/06/2022     05/06/2022    MCH 31 7 05/06/2022    MCHC 34 5 05/06/2022    RDW 11 9 05/06/2022    MPV 9 1 05/06/2022    NRBC 0 05/01/2022       BMP/CMP:  Lab Results   Component Value Date    K 4 0 05/06/2022     05/06/2022    CO2 29 05/06/2022    BUN 14 05/06/2022    CREATININE 0 72 05/06/2022    CALCIUM 8 6 05/06/2022    AST 21 04/27/2022    ALT 25 04/27/2022    ALKPHOS 100 04/27/2022    EGFR 89 05/06/2022       Physical Exam:  General Appearance:    Alert and orientated x 3, cooperative, no distress, appears stated age   Lungs:     Clear to auscultation bilaterally, respirations unlabored, no wheezes    Heart:    Regular rate and rhythm, S1 and S2 normal, no murmur   Abdomen:    Normoactive BS, soft, non tender, non rigid, no masses, no palpated organomegaly  Wound/Dressing:  C/d/i, no drainage, surrounding erythema at all incisions which is superficial and likely from adhesive reaction   Extremities:  Extremities normal, no calf tenderness, no cyanosis or edema   Pulses:   2+ and symmetric all extremities   Skin:   Skin color, texture, turgor normal, redness under breast with satellite lesion, appears fungal   Neurologic:   CNII-XII intact, normal strength, affect appropriate     Imaging:  FL small bowel  Result Date: 5/2/2022  Impression: Dilated gas-filled small bowel loops noted on the  radiograph No opacification of the colon noted at 4 hours with Gastrografin administration No significant contrast noted in the small bowel loops at 6 hour image  after the restart obstruction Small bowel obstruction is suggested The proximal hole of the feeding tube lies near the GE junction  The study was marked in [facility] for immediate notification   Workstation performed: [id number]     CT abdomen pelvis with contrast  Result Date: 4/27/2022  Impression: Small bowel obstruction with the transition point in the right side of the abdomen where there is a loop of thickened small bowel with surrounding mild inflammation  This could reflect focal enteritis although underlying no bowel lesion cannot be excluded  Clinical correlation and follow-up advised   Workstation performed: [id number]     VTE Pharmacologic Prophylaxis: Heparin  VTE Mechanical Prophylaxis: sequential compression device    Sabas Olsen PA-C   5/6/2022

## 2022-05-06 NOTE — PLAN OF CARE
Problem: PAIN - ADULT  Goal: Verbalizes/displays adequate comfort level or baseline comfort level  Description: Interventions:  - Encourage patient to monitor pain and request assistance  - Assess pain using appropriate pain scale  - Administer analgesics based on type and severity of pain and evaluate response  - Implement non-pharmacological measures as appropriate and evaluate response  - Consider cultural and social influences on pain and pain management  - Notify physician/advanced practitioner if interventions unsuccessful or patient reports new pain  Outcome: Progressing     Problem: INFECTION - ADULT  Goal: Absence or prevention of progression during hospitalization  Description: INTERVENTIONS:  - Assess and monitor for signs and symptoms of infection  - Monitor lab/diagnostic results  - Monitor all insertion sites, i e  indwelling lines, tubes, and drains  - [place] appropriate cooling/warming therapies per order  - Administer medications as ordered  - Instruct and encourage patient and family to use good hand hygiene technique  - Identify and instruct in appropriate isolation precautions for identified infection/condition  Outcome: Progressing  Goal: Absence of fever/infection during neutropenic period  Description: INTERVENTIONS:  - Monitor WBC    Outcome: Progressing     Problem: Prexisting or High Potential for Compromised Skin Integrity  Goal: Skin integrity is maintained or improved  Description: INTERVENTIONS:  - Identify patients at risk for skin breakdown  - Assess and monitor skin integrity  - Assess and monitor nutrition and hydration status  - Monitor labs   - Assess for incontinence   - Turn and reposition patient  - Assist with mobility/ambulation  - Relieve pressure over bony prominences  - Avoid friction and shearing  - Provide appropriate hygiene as needed including keeping skin clean and dry  - Evaluate need for skin moisturizer/barrier cream  - Collaborate with interdisciplinary team - Patient/family teaching  - Consider wound care consult   Outcome: Progressing     Problem: GASTROINTESTINAL - ADULT  Goal: Minimal or absence of nausea and/or vomiting  Description: INTERVENTIONS:  - Administer IV fluids if ordered to ensure adequate hydration  - Administer ordered antiemetic medications as needed  - Provide nonpharmacologic comfort measures as appropriate  - Advance diet as tolerated, if ordered  - Consider nutrition services referral to assist patient with adequate nutrition and appropriate food choices  Outcome: Progressing  Goal: Maintains or returns to baseline bowel function  Description: INTERVENTIONS:  - Assess bowel function  - Encourage oral fluids to ensure adequate hydration  - Administer IV fluids if ordered to ensure adequate hydration  - Administer ordered medications as needed  - Encourage mobilization and activity  - Consider nutritional services referral to assist patient with adequate nutrition and appropriate food choices  Outcome: Progressing     Problem: Nutrition/Hydration-ADULT  Goal: Nutrient/Hydration intake appropriate for improving, restoring or maintaining nutritional needs  Description: Monitor and assess patient's nutrition/hydration status for malnutrition  Collaborate with interdisciplinary team and initiate plan and interventions as ordered  Monitor patient's weight and dietary intake as ordered or per policy  Utilize nutrition screening tool and intervene as necessary  Determine patient's food preferences and provide high-protein, high-caloric foods as appropriate       INTERVENTIONS:  - Monitor oral intake, urinary output, labs, and treatment plans  - Assess nutrition and hydration status and recommend course of action  - Evaluate amount of meals eaten  - Assist patient with eating if necessary   - Allow adequate time for meals  - Recommend/ encourage appropriate diets, oral nutritional supplements, and vitamin/mineral supplements  - Order, calculate, and assess calorie counts as needed  - Assess need for intravenous fluids  - Provide nutrition/hydration education as appropriate  - Include patient/family/caregiver in decisions related to nutrition  Outcome: Progressing   Problem: MOBILITY - ADULT  Goal: Maintain or return to baseline ADL function  Description: INTERVENTIONS:  -  Assess patient's ability to carry out ADLs; assess patient's baseline for ADL function and identify physical deficits which impact ability to perform ADLs (bathing, care of mouth/teeth, toileting, grooming, dressing, etc )  - Assess/evaluate cause of self-care deficits   - Assess range of motion  - Assess patient's mobility; develop plan if impaired  - Assess patient's need for assistive devices and provide as appropriate  - Encourage maximum independence but intervene and supervise when necessary  - Involve family in performance of ADLs  - Assess for home care needs following discharge   - Consider OT consult to assist with ADL evaluation and planning for discharge  - Provide patient education as appropriate  Outcome: Progressing     Patient received  VSS  No distress noted  Denies abdominal pain  Able to make needs known

## 2022-05-06 NOTE — DISCHARGE SUMMARY
Discharge Summary - Aman Mi 59 y o  female MRN: [id number]    Unit/Bed#: -01 Encounter: [id number]    Admission Date: 4/27/2022   Discharge Date: 05/06/22    Admitting Diagnosis:   Vomiting [R11 10]  SBO (small bowel obstruction) ([facility] [address] ) [K56 609]  Weakness [R53 1]  Abdominal pain [R10 9]    Principle/ Secondary Diagnosis:  Past Medical History:   Diagnosis Date    Hyperlipidemia      Past Surgical History:   Procedure Laterality Date    APPENDECTOMY      BLADDER SUSPENSION      HYSTERECTOMY      OOPHORECTOMY      [place] LAP,DIAGNOSTIC ABDOMEN N/A 5/3/2022    Procedure: LAPAROSCOPY DIAGNOSTIC, LYSIS OF ADHESIONS;  Surgeon: Aylin Freeman DO;  Location: [place] [facility] OR;  Service: General    TUMOR REMOVAL  2003    spinal chord     Discharge Diagnoses: Active Problems:    * No active hospital problems  *      Procedures Performed:  LAPAROSCOPY DIAGNOSTIC, LYSIS OF ADHESIONS (N/A Abdomen)  Procedure(s):  LAPAROSCOPY DIAGNOSTIC, LYSIS OF ADHESIONS    Consultants:       History of Present Illness:  Per H&P "Niurka Smith is a 59y o  year old female with PMHx of a several abdominal surgeries who presented to the emergency department last night with abdominal pain which started Tuesday morning of practice aggressively became worse throughout the day with abdominal distention, nausea and vomiting  Patient states she has had intermittent abdominal pain over the last few months which resolves and has not been this severe  She states she does with constipation and sometimes will not go for 2-3 days and then have a small formed bowel movement  She states and she has normal bowel movements in the cycle repeats itself  She does not take anything for her constipation but did take a stool softener on Wednesday morning  She did have a small amount of stool but has not had any flatus or bowel movement since that time    Patient states when she presented to emergency department her abdomen was very painful and distended  This has improved with NG tube decompression and she does have some abdominal soreness but not the pain she had on presentation  She denies any current nausea or vomiting  She is not passing any flatus  She denies any shortness of breath or chest pain  She notes she is feeling very tired   The patient's surgical history includes a robotic ventral hernia repair with mesh in May of 2021, total abdominal hysterectomy about 15 years ago, and removal of a right ovarian tumor as well as appendectomy in the 80s  "    Hospital Course: Jennifer Corey is a 59 y o  female admitted for small bowel obstruction on 4/28/22  NG tube was placed and she was made NPO for conservative management  After today's patient did start passing flatus and passed a clamp trial   NG tube was removed the patient was advanced to clear liquid diet but developed return of her symptoms with abdominal pain and nausea and vomiting  NG tube was placed again and small-bowel follow-through was performed  Patient did not tolerate the test was determined she would need to go for surgical intervention  She underwent diagnostic laparoscopy in the OR with Dr Mikael Ahumada on 5/3/2022  Intraoperatively internal hernia was found from adhesions causing the small-bowel obstruction  Adhesions were lysed and internal hernia was reduced  The patient tolerated procedure well and there were no complications  Please see full dictated operative report by Dr Mikael Ahumada for further details  NG tube was kept postoperatively as well as Henry catheter  Postoperative day 1 patient was feeling well and passing flatus  She had good urine output and Henry catheter was removed  She voided spontaneously without difficulty  NG tube did have high output so it was capped for further monitoring of bowel function  Incidentally NG tube was unintentionally removed  Patient was kept NPO    She did progress and had several bowel movements on postoperative day 2 she was passing flatus and having bowel movements  She was advanced to clear liquid diet and then advanced as tolerated  On postoperative day 3 patient was deemed appropriate for discharge  She was tolerating a diet without nausea or vomiting or return of abdominal pain  She was passing flatus and having bowel movements  Pain was well controlled without medications  She was ambulating and voiding without difficulty  She did note to have erythema around her incisions which was deemed to be secondary to reaction to adhesive  Also with rash under her breast   She noted she had use some lotion and with sensitive skin she can not tolerate lotions  She was given a dose of Benadryl and Diflucan for coverage of yeast and to help allergic reaction  She was instructed to continue continue to monitor  Patient was discharged home and instructed to follow up in 2 weeks with Dr Yoanna Craig  All questions were answered to her satisfaction  The patient was seen and examined on the day of discharge:   BP 99/62   Pulse 84   Temp 98 6 °F (37 °C)   Resp 19   Ht 5' 8" (1 727 m)   Wt 83 kg (182 lb 15 7 oz)   SpO2 95%   Breastfeeding No   BMI 27 82 kg/m²   Please see progress report from 5/6/2022    Condition at Discharge: stable     Discharge instructions/Information to patient and family:   See after visit summary for information provided to patient and family  Provisions for Follow-Up Care:  See after visit summary for information related to follow-up care and any pertinent home health orders  Disposition: See After Visit Summary for discharge disposition information  Planned Readmission: No    Discharge Statement   I spent 25 minutes discharging the patient  This time was spent on the day of discharge  I had direct contact with the patient on the day of discharge  Additional documentation is required if more than 30 minutes were spent on discharge       Discharge Medications:  See after visit summary for reconciled discharge medications provided to patient and family        Jared Waite PA-C  5/6/2022

## 2022-05-06 NOTE — PLAN OF CARE
Problem: INFECTION - ADULT  Goal: Absence or prevention of progression during hospitalization  Description: INTERVENTIONS:  - Assess and monitor for signs and symptoms of infection  - Monitor lab/diagnostic results  - Monitor all insertion sites, i e  indwelling lines, tubes, and drains  - Orion appropriate cooling/warming therapies per order  - Administer medications as ordered  - Instruct and encourage patient and family to use good hand hygiene technique  - Identify and instruct in appropriate isolation precautions for identified infection/condition  Outcome: Progressing  Goal: Absence of fever/infection during neutropenic period  Description: INTERVENTIONS:  - Monitor WBC    Outcome: Progressing     Problem: DISCHARGE PLANNING  Goal: Discharge to home or other facility with appropriate resources  Description: INTERVENTIONS:  - Identify barriers to discharge w/patient and caregiver  - Arrange for needed discharge resources and transportation as appropriate  - Identify discharge learning needs (meds, wound care, etc )  - Arrange for interpretive services to assist at discharge as needed  - Refer to Case Management Department for coordinating discharge planning if the patient needs post-hospital services based on physician/advanced practitioner order or complex needs related to functional status, cognitive ability, or social support system  Outcome: Progressing     Problem: Knowledge Deficit  Goal: Patient/family/caregiver demonstrates understanding of disease process, treatment plan, medications, and discharge instructions  Description: Complete learning assessment and assess knowledge base    Interventions:  - Provide teaching at level of understanding  - Provide teaching via preferred learning methods  Outcome: Progressing     Problem: GASTROINTESTINAL - ADULT  Goal: Minimal or absence of nausea and/or vomiting  Description: INTERVENTIONS:  - Administer IV fluids if ordered to ensure adequate hydration  - Administer ordered antiemetic medications as needed  - Provide nonpharmacologic comfort measures as appropriate  - Advance diet as tolerated, if ordered  - Consider nutrition services referral to assist patient with adequate nutrition and appropriate food choices  Outcome: Progressing  Goal: Maintains or returns to baseline bowel function  Description: INTERVENTIONS:  - Assess bowel function  - Encourage oral fluids to ensure adequate hydration  - Administer IV fluids if ordered to ensure adequate hydration  - Administer ordered medications as needed  - Encourage mobilization and activity  - Consider nutritional services referral to assist patient with adequate nutrition and appropriate food choices  Outcome: Progressing     Problem: Nutrition/Hydration-ADULT  Goal: Nutrient/Hydration intake appropriate for improving, restoring or maintaining nutritional needs  Description: Monitor and assess patient's nutrition/hydration status for malnutrition  Collaborate with interdisciplinary team and initiate plan and interventions as ordered  Monitor patient's weight and dietary intake as ordered or per policy  Utilize nutrition screening tool and intervene as necessary  Determine patient's food preferences and provide high-protein, high-caloric foods as appropriate       INTERVENTIONS:  - Monitor oral intake, urinary output, labs, and treatment plans  - Assess nutrition and hydration status and recommend course of action  - Evaluate amount of meals eaten  - Assist patient with eating if necessary   - Allow adequate time for meals  - Recommend/ encourage appropriate diets, oral nutritional supplements, and vitamin/mineral supplements  - Order, calculate, and assess calorie counts as needed  - Assess need for intravenous fluids  - Provide nutrition/hydration education as appropriate  - Include patient/family/caregiver in decisions related to nutrition  Outcome: Progressing

## 2022-05-12 NOTE — UTILIZATION REVIEW
Notification of Discharge   This is a Notification of Discharge from our facility [address]  Please be advised that this patient has been discharge from our facility  Below you will find the admission and discharge date and time including the patients disposition  UTILIZATION REVIEW CONTACT:  Glorine Lesch Brendlinger  Utilization   Network Utilization Review Department  Phone: [phone] x carefully listen to the prompts  All voicemails are confidential   Email: [email or web address]     PHYSICIAN ADVISORY SERVICES:  FOR [id number] REVIEW - MEDICAL NECESSITY DENIAL  Phone: [phone]  Fax: [phone]  Email: [email or web address] (ChatAnd)     PRESENTATION DATE: 4/27/2022  4:00 PM  OBERVATION ADMISSION DATE:   INPATIENT ADMISSION DATE: 4/27/22  6:55 PM   DISCHARGE DATE: 5/6/2022 12:27 PM  DISPOSITION: Home/Self Care Home/Self Care      IMPORTANT INFORMATION:  Send all requests for admission clinical reviews, approved or denied determinations and any other requests to dedicated fax number below belonging to the campus where the patient is receiving treatment   List of dedicated fax numbers:  [address] DENIALS (Administrative/Medical Necessity) [phone]   1000  [facility] (Maternity/NICU/Pediatrics) [phone]   [facility] [phone]   [address] [facility] [phone]   [address] [place] [address] [phone]   [address] [place] [address],[address] [phone]   [facility]  [phone]   [address] [place], [place] [address] [facility] [address] [facility] [phone]

## 2022-05-18 ENCOUNTER — OFFICE VISIT (OUTPATIENT)
Dept: SURGERY | Facility: CLINIC | Age: 62
End: 2022-05-18

## 2022-05-18 VITALS
BODY MASS INDEX: 27.28 KG/M2 | SYSTOLIC BLOOD PRESSURE: 110 MMHG | RESPIRATION RATE: 16 BRPM | TEMPERATURE: 98 F | OXYGEN SATURATION: 97 % | DIASTOLIC BLOOD PRESSURE: 82 MMHG | HEART RATE: 88 BPM | WEIGHT: 180 LBS | HEIGHT: 68 IN

## 2022-05-18 DIAGNOSIS — K56.609 SMALL BOWEL OBSTRUCTION (HCC): Primary | ICD-10-CM

## 2022-05-18 PROCEDURE — 99024 POSTOP FOLLOW-UP VISIT: CPT | Performed by: STUDENT IN AN ORGANIZED HEALTH CARE EDUCATION/TRAINING PROGRAM

## 2022-05-18 NOTE — PROGRESS NOTES
Assessment/Plan:  58-year-old female status post diagnostic laparoscopy and lysis of adhesions on 05/03/2022  -patient is tolerating a diet well and having normal bowel function  -denies any abdominal pain  -patient's abdominal rash has resolved  -laparoscopic incisions and midline incision well healed without erythema induration or drainage  -recommend no heavy lifting greater than 15-20 lb for total 6 weeks after surgery  -follow-up in office as needed       1  Small bowel obstruction (HCC)               Subjective:      Patient ID: Susan Farris is a 59 y o  female  Triage Notes:    Patient is a 58-year-old female who presents to office for evaluation status post diagnostic laparoscopy and lysis of adhesions  Patient is tolerating a diet well and having normal bowel function  She denies any abdominal pain  The rash that she had on her abdomen has resolved  The following portions of the patient's history were reviewed and updated as appropriate: allergies, current medications, past family history, past medical history, past social history, past surgical history and problem list     Review of Systems   Constitutional: Negative for chills, fatigue and fever  HENT: Negative for congestion, hearing loss, rhinorrhea and sore throat  Eyes: Negative for pain and discharge  Respiratory: Negative for cough, chest tightness and shortness of breath  Cardiovascular: Negative for chest pain and palpitations  Gastrointestinal: Negative for abdominal pain, constipation, diarrhea, nausea and vomiting  Endocrine: Negative for cold intolerance and heat intolerance  Genitourinary: Negative for difficulty urinating and dysuria  Musculoskeletal: Negative for back pain and neck pain  Skin: Negative for color change and rash  Allergic/Immunologic: Negative for environmental allergies and food allergies  Neurological: Negative for seizures and headaches  Hematological: Negative for adenopathy  Does not bruise/bleed easily  Psychiatric/Behavioral: Negative for confusion and hallucinations  Objective:      /82   Pulse 88   Temp 98 °F (36 7 °C) (Temporal)   Resp 16   Ht 5' 8" (1 727 m)   Wt 81 6 kg (180 lb)   SpO2 97%   BMI 27 37 kg/m²     Below is the patient's most recent value for Albumin, ALT, AST, BUN, Calcium, Chloride, Cholesterol, CO2, Creatinine, GFR, Glucose, HDL, Hematocrit, Hemoglobin, Hemoglobin A1C, LDL, Magnesium, Phosphorus, Platelets, Potassium, PSA, Sodium, Triglycerides, and WBC  Lab Results   Component Value Date    ALT 25 04/27/2022    AST 21 04/27/2022    BUN 14 05/06/2022    CALCIUM 8 6 05/06/2022     05/06/2022    CO2 29 05/06/2022    CREATININE 0 72 05/06/2022    HCT 37 7 05/06/2022    HGB 13 0 05/06/2022    HGBA1C 5 3 05/03/2022    MG 2 1 05/04/2022    PHOS 4 3 (H) 05/04/2022     05/06/2022    K 4 0 05/06/2022    WBC 6 13 05/06/2022     Note: for a comprehensive list of the patient's lab results, access the Results Review activity  Physical Exam  Constitutional:       Appearance: Normal appearance  HENT:      Head: Normocephalic and atraumatic  Nose: Nose normal    Eyes:      General: No scleral icterus  Conjunctiva/sclera: Conjunctivae normal    Cardiovascular:      Rate and Rhythm: Normal rate  Pulmonary:      Effort: Pulmonary effort is normal    Abdominal:      General: There is no distension  Palpations: Abdomen is soft  Tenderness: There is no abdominal tenderness  Comments: Laparoscopic and midline incision well healed without erythema induration or drainage   Musculoskeletal:         General: No signs of injury  Skin:     General: Skin is warm  Coloration: Skin is not jaundiced  Neurological:      General: No focal deficit present  Mental Status: She is alert and oriented to person, place, and time     Psychiatric:         Mood and Affect: Mood normal          Behavior: Behavior normal

## 2022-07-26 ENCOUNTER — OFFICE VISIT (OUTPATIENT)
Dept: URGENT CARE | Facility: MEDICAL CENTER | Age: 62
End: 2022-07-26
Payer: COMMERCIAL

## 2022-07-26 VITALS
BODY MASS INDEX: 27.37 KG/M2 | SYSTOLIC BLOOD PRESSURE: 100 MMHG | RESPIRATION RATE: 16 BRPM | TEMPERATURE: 97.6 F | DIASTOLIC BLOOD PRESSURE: 60 MMHG | HEART RATE: 87 BPM | WEIGHT: 180 LBS | OXYGEN SATURATION: 97 %

## 2022-07-26 DIAGNOSIS — S61.239A PUNCTURE WOUND OF FINGER, INITIAL ENCOUNTER: Primary | ICD-10-CM

## 2022-07-26 PROCEDURE — 90715 TDAP VACCINE 7 YRS/> IM: CPT

## 2022-07-26 PROCEDURE — G0382 LEV 3 HOSP TYPE B ED VISIT: HCPCS | Performed by: PHYSICIAN ASSISTANT

## 2022-07-26 PROCEDURE — S9083 URGENT CARE CENTER GLOBAL: HCPCS | Performed by: PHYSICIAN ASSISTANT

## 2022-07-26 PROCEDURE — 90471 IMMUNIZATION ADMIN: CPT | Performed by: PHYSICIAN ASSISTANT

## 2022-07-26 RX ORDER — CEPHALEXIN 500 MG/1
500 CAPSULE ORAL EVERY 6 HOURS SCHEDULED
Qty: 28 CAPSULE | Refills: 0 | Status: SHIPPED | OUTPATIENT
Start: 2022-07-26 | End: 2022-08-02

## 2022-07-26 NOTE — PATIENT INSTRUCTIONS
Mahsa stuck in finger  Keflex as directed  Follow up with PCP in 3-5 days    Proceed to  ER if symptoms worsen

## 2022-07-26 NOTE — PROGRESS NOTES
[facility]        NAME: Zenon Palma is a 59 y o  female  : 1960    MRN: [id number]  DATE: 2022  TIME: 2:09 PM    Assessment and Plan   Puncture wound of finger, initial encounter [S61 239A]  1  Puncture wound of finger, initial encounter  Tdap Vaccine greater than or equal to 8yo         Patient Instructions     [place] stuck in finger  Keflex as directed  Follow up with PCP in 3-5 days  Proceed to  ER if symptoms worsen  Chief Complaint     Chief Complaint   Patient presents with    Hand Injury     Right hand middle finger, fish hook caught in hand today  History of Present Illness       66-year-old female who presents complaining of having a fish hook stuck on her finger  Patient denies any other trauma      Review of Systems   Review of Systems   Constitutional: Negative  HENT: Negative  Eyes: Negative  Respiratory: Negative  Negative for cough, chest tightness, shortness of breath, wheezing and stridor  Cardiovascular: Negative  Negative for chest pain, palpitations and leg swelling  Skin: Positive for wound  Current Medications       Current Outpatient Medications:     atorvastatin (LIPITOR) 10 mg tablet, Take 10 mg by mouth daily, Disp: , Rfl:     Cholecalciferol 125 MCG (5000 UT) capsule, , Disp: , Rfl:     Pediatric Multivitamins-Fl (MULTIVITAMINS/FL PO), Take by mouth, Disp: , Rfl:     conjugated estrogens (PREMARIN) 0 3 mg tablet, Take 0 3 mg by mouth every other day Take daily for 21 days then do not take for 7 days   (Patient not taking: Reported on 2022), Disp: , Rfl:     naproxen (NAPROSYN) 500 mg tablet, Take 1 tablet by mouth 2 (two) times a day as needed for mild pain (Patient not taking: Reported on 2022), Disp: 20 tablet, Rfl: 0    Current Allergies     Allergies as of 2022 - Reviewed 2022   Allergen Reaction Noted    Other Hives 2022    Bactrim [sulfamethoxazole-trimethoprim] Hives 12/24/2016            The following portions of the patient's history were reviewed and updated as appropriate: allergies, current medications, past family history, past medical history, past social history, past surgical history and problem list      Past Medical History:   Diagnosis Date    Hyperlipidemia        Past Surgical History:   Procedure Laterality Date    APPENDECTOMY      BLADDER SUSPENSION      HYSTERECTOMY      OOPHORECTOMY      [place] LAP,DIAGNOSTIC ABDOMEN N/A 5/3/2022    Procedure: LAPAROSCOPY DIAGNOSTIC, LYSIS OF ADHESIONS;  Surgeon: Siena Menendez DO;  Location: [facility];  Service: General    TUMOR REMOVAL  2003    spinal chord       History reviewed  No pertinent family history  Medications have been verified  Objective   /60   Pulse 87   Temp 97 6 °F (36 4 °C)   Resp 16   Wt 81 6 kg (180 lb)   SpO2 97%   BMI 27 37 kg/m²        Physical Exam     Physical Exam  Constitutional:       Appearance: Normal appearance  She is well-developed  HENT:      Head: Normocephalic and atraumatic  Right Ear: External ear normal       Left Ear: External ear normal       Nose: Nose normal       Mouth/Throat:      Pharynx: No oropharyngeal exudate  Cardiovascular:      Rate and Rhythm: Normal rate and regular rhythm  Heart sounds: Normal heart sounds  Pulmonary:      Effort: Pulmonary effort is normal  No respiratory distress  Breath sounds: Normal breath sounds  No wheezing or rales  Chest:      Chest wall: No tenderness  Musculoskeletal:        Arms:       Cervical back: Normal range of motion and neck supple  Lymphadenopathy:      Cervical: No cervical adenopathy  Neurological:      Mental Status: She is alert  Area cleaned and prepped in sterile fashion, local anesthetic 1% lidocaine injected 1 cc applied  Orange push through and hook cough then fishhook removed without complication

## 2024-10-02 DIAGNOSIS — Z00.6 ENCOUNTER FOR EXAMINATION FOR NORMAL COMPARISON OR CONTROL IN CLINICAL RESEARCH PROGRAM: ICD-10-CM

## 2024-10-24 ENCOUNTER — APPOINTMENT (OUTPATIENT)
Dept: LAB | Facility: MEDICAL CENTER | Age: 64
End: 2024-10-24

## 2024-10-24 DIAGNOSIS — Z00.6 ENCOUNTER FOR EXAMINATION FOR NORMAL COMPARISON OR CONTROL IN CLINICAL RESEARCH PROGRAM: ICD-10-CM

## 2024-10-24 PROCEDURE — 36415 COLL VENOUS BLD VENIPUNCTURE: CPT

## 2024-11-04 LAB
APOB+LDLR+PCSK9 GENE MUT ANL BLD/T: NOT DETECTED
BRCA1+BRCA2 DEL+DUP + FULL MUT ANL BLD/T: NOT DETECTED
MLH1+MSH2+MSH6+PMS2 GN DEL+DUP+FUL M: NOT DETECTED

## (undated) DEVICE — [HIGH FLOW INSUFFLATOR,  DO NOT USE IF PACKAGE IS DAMAGED,  KEEP DRY,  KEEP AWAY FROM SUNLIGHT,  PROTECT FROM HEAT AND RADIOACTIVE SOURCES.]: Brand: PNEUMOSURE

## (undated) DEVICE — TROCAR: Brand: KII® SLEEVE

## (undated) DEVICE — DRAPE EQUIPMENT RF WAND

## (undated) DEVICE — INTERCEED

## (undated) DEVICE — 3M™ TEGADERM™ TRANSPARENT FILM DRESSING FRAME STYLE, 1624W, 2-3/8 IN X 2-3/4 IN (6 CM X 7 CM), 100/CT 4CT/CASE: Brand: 3M™ TEGADERM™

## (undated) DEVICE — GAUZE SPONGES,8 PLY: Brand: CURITY

## (undated) DEVICE — ALLENTOWN LAP CHOLE APP PACK: Brand: CARDINAL HEALTH

## (undated) DEVICE — INTENDED FOR TISSUE SEPARATION, AND OTHER PROCEDURES THAT REQUIRE A SHARP SURGICAL BLADE TO PUNCTURE OR CUT.: Brand: BARD-PARKER SAFETY BLADES SIZE 15, STERILE

## (undated) DEVICE — SUT MONOCRYL 4-0 PS-2 18 IN Y496G

## (undated) DEVICE — GLOVE INDICATOR PI UNDERGLOVE SZ 7 BLUE

## (undated) DEVICE — WOUND RETRACTOR AND PROTECTOR: Brand: ALEXIS O WOUND PROTECTOR-RETRACTOR

## (undated) DEVICE — GLOVE SRG BIOGEL 7

## (undated) DEVICE — IRRIG ENDO FLO TUBING

## (undated) DEVICE — DRESSING MEPILEX AG BORDER 3 X 3 IN

## (undated) DEVICE — CHLORAPREP HI-LITE 26ML ORANGE

## (undated) DEVICE — TUBING SMOKE EVAC W/FILTRATION DEVICE PLUMEPORT ACTIV

## (undated) DEVICE — LIGHT HANDLE COVER SLEEVE DISP BLUE STELLAR

## (undated) DEVICE — TROCAR: Brand: KII FIOS FIRST ENTRY

## (undated) DEVICE — 3M™ STERI-STRIP™ REINFORCED ADHESIVE SKIN CLOSURES, R1542, 1/4 IN X 1-1/2 IN (6 MM X 38 MM), 6 STRIPS/ENVELOPE: Brand: 3M™ STERI-STRIP™